# Patient Record
Sex: MALE | Race: WHITE | NOT HISPANIC OR LATINO | Employment: FULL TIME | URBAN - METROPOLITAN AREA
[De-identification: names, ages, dates, MRNs, and addresses within clinical notes are randomized per-mention and may not be internally consistent; named-entity substitution may affect disease eponyms.]

---

## 2018-03-13 ENCOUNTER — HOSPITAL ENCOUNTER (OUTPATIENT)
Dept: GENERAL RADIOLOGY | Facility: HOSPITAL | Age: 48
Discharge: HOME | End: 2018-03-13
Attending: FAMILY MEDICINE | Admitting: FAMILY MEDICINE

## 2018-03-13 ENCOUNTER — TRANSCRIBE ORDERS (OUTPATIENT)
Dept: ADMINISTRATIVE | Facility: HOSPITAL | Age: 48
End: 2018-03-13

## 2018-03-13 DIAGNOSIS — M25.552 BILATERAL HIP PAIN: Primary | ICD-10-CM

## 2018-03-13 DIAGNOSIS — M25.551 BILATERAL HIP PAIN: Primary | ICD-10-CM

## 2018-03-13 DIAGNOSIS — M25.552 BILATERAL HIP PAIN: ICD-10-CM

## 2018-03-13 DIAGNOSIS — M25.551 BILATERAL HIP PAIN: ICD-10-CM

## 2018-03-13 PROCEDURE — 73521 X-RAY EXAM HIPS BI 2 VIEWS: CPT

## 2020-04-14 ENCOUNTER — LAB REQUISITION (OUTPATIENT)
Dept: LAB | Facility: HOSPITAL | Age: 50
End: 2020-04-14

## 2020-04-14 DIAGNOSIS — Z00.00 ROUTINE GENERAL MEDICAL EXAMINATION AT A HEALTH CARE FACILITY: ICD-10-CM

## 2020-04-14 LAB — D DIMER PPP FEU-MCNC: 0.67 MCGFEU/ML (ref 0–0.56)

## 2020-04-14 PROCEDURE — 85379 FIBRIN DEGRADATION QUANT: CPT | Performed by: FAMILY MEDICINE

## 2020-04-15 ENCOUNTER — TRANSCRIBE ORDERS (OUTPATIENT)
Dept: ADMINISTRATIVE | Facility: HOSPITAL | Age: 50
End: 2020-04-15

## 2020-04-15 DIAGNOSIS — I26.99 PULMONARY EMBOLISM ON RIGHT (HCC): Primary | ICD-10-CM

## 2020-04-16 ENCOUNTER — HOSPITAL ENCOUNTER (OUTPATIENT)
Dept: CARDIOLOGY | Facility: HOSPITAL | Age: 50
Discharge: HOME | End: 2020-04-16
Admitting: FAMILY MEDICINE

## 2020-04-16 VITALS — BODY MASS INDEX: 23.91 KG/M2 | WEIGHT: 167 LBS | HEIGHT: 70 IN

## 2020-04-16 DIAGNOSIS — I26.99 PULMONARY EMBOLISM ON RIGHT (HCC): ICD-10-CM

## 2020-04-16 LAB
BH CV LOWER VASCULAR LEFT COMMON FEMORAL AUGMENT: NORMAL
BH CV LOWER VASCULAR LEFT COMMON FEMORAL COMPRESS: NORMAL
BH CV LOWER VASCULAR LEFT COMMON FEMORAL PHASIC: NORMAL
BH CV LOWER VASCULAR LEFT COMMON FEMORAL SPONT: NORMAL
BH CV LOWER VASCULAR LEFT DISTAL FEMORAL AUGMENT: NORMAL
BH CV LOWER VASCULAR LEFT DISTAL FEMORAL COMPRESS: NORMAL
BH CV LOWER VASCULAR LEFT DISTAL FEMORAL PHASIC: NORMAL
BH CV LOWER VASCULAR LEFT DISTAL FEMORAL SPONT: NORMAL
BH CV LOWER VASCULAR LEFT GASTRONEMIUS COMPRESS: NORMAL
BH CV LOWER VASCULAR LEFT GREATER SAPH AK COMPRESS: NORMAL
BH CV LOWER VASCULAR LEFT GREATER SAPH BK COMPRESS: NORMAL
BH CV LOWER VASCULAR LEFT LESSER SAPH COMPRESS: NORMAL
BH CV LOWER VASCULAR LEFT MID FEMORAL AUGMENT: NORMAL
BH CV LOWER VASCULAR LEFT MID FEMORAL COMPRESS: NORMAL
BH CV LOWER VASCULAR LEFT MID FEMORAL PHASIC: NORMAL
BH CV LOWER VASCULAR LEFT MID FEMORAL SPONT: NORMAL
BH CV LOWER VASCULAR LEFT PERONEAL AUGMENT: NORMAL
BH CV LOWER VASCULAR LEFT PERONEAL COMPRESS: NORMAL
BH CV LOWER VASCULAR LEFT POPLITEAL AUGMENT: NORMAL
BH CV LOWER VASCULAR LEFT POPLITEAL COMPRESS: NORMAL
BH CV LOWER VASCULAR LEFT POPLITEAL PHASIC: NORMAL
BH CV LOWER VASCULAR LEFT POPLITEAL SPONT: NORMAL
BH CV LOWER VASCULAR LEFT POSTERIOR TIBIAL AUGMENT: NORMAL
BH CV LOWER VASCULAR LEFT POSTERIOR TIBIAL COMPRESS: NORMAL
BH CV LOWER VASCULAR LEFT PROFUNDA FEMORAL AUGMENT: NORMAL
BH CV LOWER VASCULAR LEFT PROFUNDA FEMORAL PHASIC: NORMAL
BH CV LOWER VASCULAR LEFT PROFUNDA FEMORAL SPONT: NORMAL
BH CV LOWER VASCULAR LEFT PROXIMAL FEMORAL AUGMENT: NORMAL
BH CV LOWER VASCULAR LEFT PROXIMAL FEMORAL COMPRESS: NORMAL
BH CV LOWER VASCULAR LEFT PROXIMAL FEMORAL PHASIC: NORMAL
BH CV LOWER VASCULAR LEFT PROXIMAL FEMORAL SPONT: NORMAL
BH CV LOWER VASCULAR LEFT SAPHENOFEMORAL JUNCTION AUGMENT: NORMAL
BH CV LOWER VASCULAR LEFT SAPHENOFEMORAL JUNCTION COMPRESS: NORMAL
BH CV LOWER VASCULAR LEFT SAPHENOFEMORAL JUNCTION PHASIC: NORMAL
BH CV LOWER VASCULAR LEFT SAPHENOFEMORAL JUNCTION SPONT: NORMAL
BH CV LOWER VASCULAR RIGHT COMMON FEMORAL AUGMENT: NORMAL
BH CV LOWER VASCULAR RIGHT COMMON FEMORAL COMPRESS: NORMAL
BH CV LOWER VASCULAR RIGHT COMMON FEMORAL PHASIC: NORMAL
BH CV LOWER VASCULAR RIGHT COMMON FEMORAL SPONT: NORMAL
BH CV LOWER VASCULAR RIGHT DISTAL FEMORAL AUGMENT: NORMAL
BH CV LOWER VASCULAR RIGHT DISTAL FEMORAL COMPRESS: NORMAL
BH CV LOWER VASCULAR RIGHT DISTAL FEMORAL PHASIC: NORMAL
BH CV LOWER VASCULAR RIGHT DISTAL FEMORAL SPONT: NORMAL
BH CV LOWER VASCULAR RIGHT GASTRONEMIUS COMPRESS: NORMAL
BH CV LOWER VASCULAR RIGHT GREATER SAPH AK COMPRESS: NORMAL
BH CV LOWER VASCULAR RIGHT GREATER SAPH BK COMPRESS: NORMAL
BH CV LOWER VASCULAR RIGHT LESSER SAPH COMPRESS: NORMAL
BH CV LOWER VASCULAR RIGHT MID FEMORAL AUGMENT: NORMAL
BH CV LOWER VASCULAR RIGHT MID FEMORAL COMPRESS: NORMAL
BH CV LOWER VASCULAR RIGHT MID FEMORAL PHASIC: NORMAL
BH CV LOWER VASCULAR RIGHT MID FEMORAL SPONT: NORMAL
BH CV LOWER VASCULAR RIGHT PERONEAL AUGMENT: NORMAL
BH CV LOWER VASCULAR RIGHT PERONEAL COMPRESS: NORMAL
BH CV LOWER VASCULAR RIGHT POPLITEAL AUGMENT: NORMAL
BH CV LOWER VASCULAR RIGHT POPLITEAL COMPRESS: NORMAL
BH CV LOWER VASCULAR RIGHT POPLITEAL PHASIC: NORMAL
BH CV LOWER VASCULAR RIGHT POPLITEAL SPONT: NORMAL
BH CV LOWER VASCULAR RIGHT POSTERIOR TIBIAL AUGMENT: NORMAL
BH CV LOWER VASCULAR RIGHT POSTERIOR TIBIAL COMPRESS: NORMAL
BH CV LOWER VASCULAR RIGHT PROFUNDA FEMORAL AUGMENT: NORMAL
BH CV LOWER VASCULAR RIGHT PROFUNDA FEMORAL PHASIC: NORMAL
BH CV LOWER VASCULAR RIGHT PROFUNDA FEMORAL SPONT: NORMAL
BH CV LOWER VASCULAR RIGHT PROXIMAL FEMORAL AUGMENT: NORMAL
BH CV LOWER VASCULAR RIGHT PROXIMAL FEMORAL COMPRESS: NORMAL
BH CV LOWER VASCULAR RIGHT PROXIMAL FEMORAL PHASIC: NORMAL
BH CV LOWER VASCULAR RIGHT PROXIMAL FEMORAL SPONT: NORMAL
BH CV LOWER VASCULAR RIGHT SAPHENOFEMORAL JUNCTION AUGMENT: NORMAL
BH CV LOWER VASCULAR RIGHT SAPHENOFEMORAL JUNCTION COMPRESS: NORMAL
BH CV LOWER VASCULAR RIGHT SAPHENOFEMORAL JUNCTION PHASIC: NORMAL
BH CV LOWER VASCULAR RIGHT SAPHENOFEMORAL JUNCTION SPONT: NORMAL

## 2020-04-16 PROCEDURE — 93970 EXTREMITY STUDY: CPT | Performed by: INTERNAL MEDICINE

## 2020-04-16 PROCEDURE — 93970 EXTREMITY STUDY: CPT

## 2020-05-22 ENCOUNTER — TELEPHONE (OUTPATIENT)
Dept: ONCOLOGY | Facility: CLINIC | Age: 50
End: 2020-05-22

## 2020-05-25 NOTE — PROGRESS NOTES
DATE OF CONSULTATION: 5/26/2020    REFERRING PHYSICIAN: Андрей Kang MD    Dear Андрей Fortune MD  Thank you for asking for my medical advice on this patient. I saw him in the  [place] office on 5/26/2020    REASON FOR CONSULTATION: Right sided pulmonary embolism     HISTORY OF PRESENT ILLNESS: The patient is a very pleasant 49 y.o.  male   who was in his usual state of health until earlier on this month he presented with right lower ribs pain.  This is been going on for the last few weeks.  Get worse with deep breath.  Improves with rest.  Associated with shortness of breath.  Has been waking up at night with difficulty breathing.  Never had this problem before.  Is been having bilateral lower extremities pain.  The patient was worried about blood clot.  His primary care provider ordered a d-dimer that came back positive.  This was followed by CT PE protocol done May 5, 2020 that was suspicious for right segmental pulmonary embolism.  The patient was started on Xarelto 50 mg twice a day with improvement in his symptoms. Venous doppler right lower extremity done on April 16, 2020 was negative. I was consulted to further assist in his care.    SUBJECTIVE: When I saw the patient today he is here by himself.  He is feeling better.  He does have chronic pains however he believes his right lower ribs pain did not improve with Xarelto as well as his breathing.  Denies any bleeding.  Never been diagnosed with blood clots before.  Denies any family history of venous or arterial thrombosis.  The patient admits sedentary lifestyle.  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    Review of Systems   Constitutional: Negative for activity change, appetite change, chills, fatigue, fever and unexpected weight change.   HENT: Negative for hearing loss, mouth sores, nosebleeds, sore throat and trouble swallowing.    Eyes: Negative for visual disturbance.    Respiratory: Negative for cough, chest tightness, shortness of breath and wheezing.    Cardiovascular: Negative for chest pain, palpitations and leg swelling.   Gastrointestinal: Negative for abdominal distention, abdominal pain, blood in stool, constipation, diarrhea, nausea, rectal pain and vomiting.   Endocrine: Negative for cold intolerance and heat intolerance.   Genitourinary: Negative for difficulty urinating, dysuria, frequency and urgency.   Musculoskeletal: Negative for arthralgias, back pain, gait problem, joint swelling and myalgias.   Skin: Negative for rash.   Neurological: Negative for dizziness, tremors, syncope, weakness, light-headedness, numbness and headaches.   Hematological: Negative for adenopathy. Does not bruise/bleed easily.   Psychiatric/Behavioral: Negative for confusion, sleep disturbance and suicidal ideas. The patient is not nervous/anxious.        No past medical history on file.    Social History     Socioeconomic History   • Marital status:      Spouse name: Not on file   • Number of children: Not on file   • Years of education: Not on file   • Highest education level: Not on file       No family history on file.    No past surgical history on file.    Allergies   Allergen Reactions   • Adhesive Tape Unknown - Low Severity   • Carbamazepine Unknown - Low Severity   • Latex Unknown - Low Severity          Current Outpatient Medications:   •  celecoxib (CeleBREX) 200 MG capsule, Take 1 capsule by mouth., Disp: , Rfl:   •  cetirizine (ZyrTEC Allergy) 10 MG tablet, Take  by mouth Daily., Disp: , Rfl:   •  chlorzoxazone (PARAFON FORTE) 500 MG tablet, Take  by mouth., Disp: , Rfl:   •  gabapentin, once-daily, (Gralise) 600 MG tablet tablet, Take  by mouth., Disp: , Rfl:   •  lamoTRIgine (LaMICtal) 25 MG tablet, Take 25mg qd for a week and taper by 1 tab every 2 weeks to 50mg bid, Disp: , Rfl:   •  meloxicam (MOBIC) 7.5 MG tablet, Take  by mouth., Disp: , Rfl:   •   "mometasone-formoterol (Dulera) 100-5 MCG/ACT inhaler, Inhale 2 puffs., Disp: , Rfl:   •  pantoprazole (PROTONIX) 40 MG EC tablet, Take  by mouth Daily., Disp: , Rfl:   •  pregabalin (Lyrica) 100 MG capsule, Take 1 capsule by mouth 3 (Three) Times a Day., Disp: , Rfl:   •  sildenafil (Viagra) 100 MG tablet, Take  by mouth., Disp: , Rfl:   •  tiZANidine (ZANAFLEX) 2 MG tablet, Take  by mouth., Disp: , Rfl:   •  zolpidem (AMBIEN) 10 MG tablet, Take 10 mg by mouth., Disp: , Rfl:   •  amitriptyline (ELAVIL) 10 MG tablet, Take  by mouth Daily., Disp: , Rfl:   •  cephalexin (KEFLEX) 500 MG capsule, Take 500 mg by mouth 2 (Two) Times a Day., Disp: , Rfl:   •  diazePAM (VALIUM) 10 MG tablet, TAKE 1/2 TO 1 TABLET BY MOUTH EVERY 6 HOURS AS NEEDED FOR MUSCLE SPASMS, Disp: , Rfl:   •  enoxaparin (LOVENOX) 40 MG/0.4ML solution syringe, 1 (ONE) INJECTION DAILY 3 DAYS BEFORE SURGERY AND 7 DAYS AFTER, Disp: , Rfl:   •  gabapentin (NEURONTIN) 100 MG capsule, TAKE 3 (THREE) CAPSULE AT BEDTIME, Disp: , Rfl:   •  Naproxen Sodium 220 MG capsule, Take  by mouth., Disp: , Rfl:   •  XARELTO 15 MG tablet, TAKE 1 TABLET BY MOUTH TWICE A DAY *THEN START 20MG XARELTO, Disp: , Rfl:     PHYSICAL EXAMINATION:   /76   Pulse 87   Temp 97.3 °F (36.3 °C) (Temporal)   Resp 18   Ht 177.8 cm (70\")   Wt 84.4 kg (186 lb)   SpO2 98%   BMI 26.69 kg/m²   Pain Score    05/26/20 0945   PainSc: 0-No pain       ECOG Performance Status: 1 - Symptomatic but completely ambulatory  General Appearance:  alert, cooperative, no apparent distress and appears stated age   Neurologic/Psychiatric: A&O x 3, gait steady, appropriate affect, strength 5/5 in all muscle groups   HEENT:  Normocephalic, without obvious abnormality, mucous membranes moist   Neck: Supple, symmetrical, trachea midline, no adenopathy;  No thyromegaly, masses, or tenderness   Lungs:   Clear to auscultation bilaterally; respirations regular, even, and unlabored bilaterally   Heart:  Regular " rate and rhythm, no murmurs appreciated   Abdomen:   Soft, non-tender, non-distended and no organomegaly   Lymph nodes: No cervical, supraclavicular, inguinal or axillary adenopathy noted   Extremities: Normal, atraumatic; no clubbing, cyanosis, or edema    Skin: No rashes, ulcers, or suspicious lesions noted       No visits with results within 2 Week(s) from this visit.   Latest known visit with results is:   Hospital Outpatient Visit on 04/16/2020   Component Date Value Ref Range Status   • Right Common Femoral Spont 04/16/2020 Y   Final   • Right Common Femoral Phasic 04/16/2020 Y   Final   • Right Common Femoral Augment 04/16/2020 Y   Final   • Right Common Femoral Compress 04/16/2020 C   Final   • Right Saphenofemoral Junction Spont 04/16/2020 Y   Final   • Right Saphenofemoral Junction Phas* 04/16/2020 Y   Final   • Right Saphenofemoral Junction Augm* 04/16/2020 Y   Final   • Right Saphenofemoral Junction Comp* 04/16/2020 C   Final   • Right Profunda Femoral Spont 04/16/2020 Y   Final   • Right Profunda Femoral Phasic 04/16/2020 Y   Final   • Right Profunda Femoral Augment 04/16/2020 Y   Final   • Right Proximal Femoral Spont 04/16/2020 Y   Final   • Right Proximal Femoral Phasic 04/16/2020 Y   Final   • Right Proximal Femoral Augment 04/16/2020 Y   Final   • Right Proximal Femoral Compress 04/16/2020 C   Final   • Right Mid Femoral Spont 04/16/2020 Y   Final   • Right Mid Femoral Phasic 04/16/2020 Y   Final   • Right Mid Femoral Augment 04/16/2020 Y   Final   • Right Mid Femoral Compress 04/16/2020 C   Final   • Right Distal Femoral Spont 04/16/2020 Y   Final   • Right Distal Femoral Phasic 04/16/2020 Y   Final   • Right Distal Femoral Augment 04/16/2020 Y   Final   • Right Distal Femoral Compress 04/16/2020 C   Final   • Right Popliteal Spont 04/16/2020 Y   Final   • Right Popliteal Phasic 04/16/2020 Y   Final   • Right Popliteal Augment 04/16/2020 Y   Final   • Right Popliteal Compress 04/16/2020 C   Final    • Right Posterior Tibial Augment 04/16/2020 Y   Final   • Right Posterior Tibial Compress 04/16/2020 C   Final   • Right Peroneal Augment 04/16/2020 Y   Final   • Right Peroneal Compress 04/16/2020 C   Final   • Right GastronemiusSoleal Compress 04/16/2020 C   Final   • Right Greater Saph AK Compress 04/16/2020 C   Final   • Right Greater Saph BK Compress 04/16/2020 C   Final   • Right Lesser Saph Compress 04/16/2020 C   Final   • Left Common Femoral Spont 04/16/2020 Y   Final   • Left Common Femoral Phasic 04/16/2020 Y   Final   • Left Common Femoral Augment 04/16/2020 Y   Final   • Left Common Femoral Compress 04/16/2020 C   Final   • Left Saphenofemoral Junction Spont 04/16/2020 Y   Final   • Left Saphenofemoral Junction Phasic 04/16/2020 Y   Final   • Left Saphenofemoral Junction Augme* 04/16/2020 Y   Final   • Left Saphenofemoral Junction Compr* 04/16/2020 C   Final   • Left Profunda Femoral Spont 04/16/2020 Y   Final   • Left Profunda Femoral Phasic 04/16/2020 Y   Final   • Left Profunda Femoral Augment 04/16/2020 Y   Final   • Left Proximal Femoral Spont 04/16/2020 Y   Final   • Left Proximal Femoral Phasic 04/16/2020 Y   Final   • Left Proximal Femoral Augment 04/16/2020 Y   Final   • Left Proximal Femoral Compress 04/16/2020 C   Final   • Left Mid Femoral Spont 04/16/2020 Y   Final   • Left Mid Femoral Phasic 04/16/2020 Y   Final   • Left Mid Femoral Augment 04/16/2020 Y   Final   • Left Mid Femoral Compress 04/16/2020 C   Final   • Left Distal Femoral Spont 04/16/2020 Y   Final   • Left Distal Femoral Phasic 04/16/2020 Y   Final   • Left Distal Femoral Augment 04/16/2020 Y   Final   • Left Distal Femoral Compress 04/16/2020 C   Final   • Left Popliteal Spont 04/16/2020 Y   Final   • Left Popliteal Phasic 04/16/2020 Y   Final   • Left Popliteal Augment 04/16/2020 Y   Final   • Left Popliteal Compress 04/16/2020 C   Final   • Left Posterior Tibial Augment 04/16/2020 Y   Final   • Left Posterior Tibial  Compress 04/16/2020 C   Final   • Left Peroneal Augment 04/16/2020 Y   Final   • Left Peroneal Compress 04/16/2020 C   Final   • Left GastronemiusSoleal Compress 04/16/2020 C   Final   • Left Greater Saph AK Compress 04/16/2020 C   Final   • Left Greater Saph BK Compress 04/16/2020 C   Final   • Left Lesser Saph Compress 04/16/2020 C   Final        No results found.      DIAGNOSTIC DATA:   1. Radiology:   (ICD-10-CM)]   Interpretation Summary     · No evidence of deep or superficial venous thrombosis in the right or left lower extremities.       2. Dr. Kang's note reviewed by me and documented in the  chart.   3. Pathology report: none available  4. Laboratory data: reviewed labs from April 15, 2020 and documented in the patient's chart.     ASSESSMENT: The patient is a very pleasant 49 y.o.  male  with right segmental pulmonary embolism    PROBLEM LIST:   1.  Right lower lobe pulmonary embolism:  A.  Presented with shortness of breath and chest pain  B.  CT PE protocol done on May 5, 2020 was questionable for right segmental pulmonary embolism  C.  Currently on Xarelto once a day  2.  Immobility  3.  Neuropathy    PLAN:   1. I had a long discussion today with the patient about his  new diagnosis of PE. I reviewed the patient's documents including refereing provider's notes, lab results, imaging, and path report.   2.  Regarding etiology patient has multiple acquired risk factor including immobility as well as frequent travel.  He did have recent procedure done as well with a spinal stimulator trial.  Given his young age as well as the lack of other risk factors I think inherited thrombophilia is a concern as well.  3.  Regarding treatment Xarelto is an FDA approved treatment.  Patient is unable to afford high copayment.  We will switch to Eliquis 5 mg twice daily.  4.  Regarding duration of treatment this is been more difficult question.  The patient will be treated with 5 mg twice a day at least for 6 months.   At that point I will stop his anticoagulation and do thrombophilia work-up.  The patient might benefit from lifelong anticoagulation if he does not change his lifestyle or if he has evidence of inherited thrombophilia.  5.  The patient will follow-up with me in 6 months per  7.  Patient was advised to exercise.  8. He will continue Lyrica and gabapentin for neuropathy.  Marco A Suggs MD  5/26/2020

## 2020-05-26 ENCOUNTER — CONSULT (OUTPATIENT)
Dept: ONCOLOGY | Facility: CLINIC | Age: 50
End: 2020-05-26

## 2020-05-26 VITALS
DIASTOLIC BLOOD PRESSURE: 76 MMHG | SYSTOLIC BLOOD PRESSURE: 110 MMHG | WEIGHT: 186 LBS | HEART RATE: 87 BPM | BODY MASS INDEX: 26.63 KG/M2 | TEMPERATURE: 97.3 F | HEIGHT: 70 IN | OXYGEN SATURATION: 98 % | RESPIRATION RATE: 18 BRPM

## 2020-05-26 DIAGNOSIS — I26.99 PULMONARY EMBOLISM ON RIGHT (HCC): Primary | ICD-10-CM

## 2020-05-26 PROCEDURE — 99204 OFFICE O/P NEW MOD 45 MIN: CPT | Performed by: INTERNAL MEDICINE

## 2020-05-26 RX ORDER — DIAZEPAM 10 MG/1
TABLET ORAL
COMMUNITY
Start: 2020-05-16 | End: 2020-11-20

## 2020-05-26 RX ORDER — CELECOXIB 200 MG/1
1 CAPSULE ORAL
COMMUNITY
Start: 2020-04-14 | End: 2020-11-20

## 2020-05-26 RX ORDER — AMITRIPTYLINE HYDROCHLORIDE 10 MG/1
TABLET, FILM COATED ORAL DAILY
COMMUNITY
End: 2020-11-20

## 2020-05-26 RX ORDER — PANTOPRAZOLE SODIUM 40 MG/1
TABLET, DELAYED RELEASE ORAL DAILY
COMMUNITY
Start: 2019-12-05 | End: 2020-11-20

## 2020-05-26 RX ORDER — RIVAROXABAN 15 MG/1
TABLET, FILM COATED ORAL
COMMUNITY
Start: 2020-04-15 | End: 2020-06-08 | Stop reason: ALTCHOICE

## 2020-05-26 RX ORDER — SILDENAFIL 100 MG/1
TABLET, FILM COATED ORAL
COMMUNITY
Start: 2015-05-05 | End: 2020-11-20

## 2020-05-26 RX ORDER — PREGABALIN 100 MG/1
100 CAPSULE ORAL NIGHTLY
COMMUNITY
Start: 2015-05-05

## 2020-05-26 RX ORDER — ZOLPIDEM TARTRATE 10 MG/1
10 TABLET ORAL NIGHTLY PRN
COMMUNITY
Start: 2017-04-27

## 2020-05-26 RX ORDER — LAMOTRIGINE 25 MG/1
TABLET ORAL
COMMUNITY
Start: 2017-11-07 | End: 2020-11-20

## 2020-05-26 RX ORDER — CHLORZOXAZONE 500 MG/1
TABLET ORAL
COMMUNITY
Start: 2020-04-14 | End: 2020-11-20

## 2020-05-26 RX ORDER — GABAPENTIN 100 MG/1
100 CAPSULE ORAL DAILY
COMMUNITY
Start: 2020-04-22 | End: 2021-09-17

## 2020-05-26 RX ORDER — CEPHALEXIN 500 MG/1
500 CAPSULE ORAL 2 TIMES DAILY
COMMUNITY
Start: 2020-05-16 | End: 2020-11-20

## 2020-05-26 RX ORDER — COVID-19 ANTIGEN TEST
KIT MISCELLANEOUS
COMMUNITY
End: 2020-11-20

## 2020-05-26 RX ORDER — MELOXICAM 7.5 MG/1
TABLET ORAL
COMMUNITY
Start: 2015-05-05 | End: 2020-11-20

## 2020-05-26 RX ORDER — CETIRIZINE HYDROCHLORIDE 10 MG/1
TABLET ORAL DAILY
COMMUNITY
Start: 2008-02-22

## 2020-05-26 RX ORDER — TIZANIDINE 2 MG/1
TABLET ORAL
COMMUNITY
Start: 2019-12-05 | End: 2020-11-20

## 2020-06-08 ENCOUNTER — TELEPHONE (OUTPATIENT)
Dept: ONCOLOGY | Facility: CLINIC | Age: 50
End: 2020-06-08

## 2020-06-08 NOTE — TELEPHONE ENCOUNTER
Patient called wanting his Eliquis sent in to [facility] on [address]. He only has one left for today.

## 2020-06-11 ENCOUNTER — TELEPHONE (OUTPATIENT)
Dept: ONCOLOGY | Facility: CLINIC | Age: 50
End: 2020-06-11

## 2020-06-11 NOTE — TELEPHONE ENCOUNTER
Called [facility] and received fax. Discussed with dr patrick, and patient advised I will fax this back tomorrow after receiving signature for clearance. To return call if any further questions or concerns

## 2020-06-11 NOTE — TELEPHONE ENCOUNTER
PT called to get the form faxed back to his surgeon clearing him for spinal surgery. He needs ir within the next couple of days. Please call pt back with an update @[phone].

## 2020-06-12 NOTE — TELEPHONE ENCOUNTER
PT CALLED BACK. HE WANTS TO MAKE SURE DR PARRISH AGREES AND HAS FAXED BACK THE FORM TO HIS SURGERON.    BERTA   - [phone]

## 2020-06-15 NOTE — TELEPHONE ENCOUNTER
Can you fax surgery clearance to [facility]  Fax:[phone]      Family  did not send it over to them as they should have.

## 2020-08-03 ENCOUNTER — TELEPHONE (OUTPATIENT)
Dept: ONCOLOGY | Facility: CLINIC | Age: 50
End: 2020-08-03

## 2020-08-03 NOTE — TELEPHONE ENCOUNTER
Patient has dry eye syndrome and being on eliquis twice a day is making his eyes and mouth even drier. Patient has having trouble reading his laptop and dry mouth is giving him dental pain. Patient has stopped taking the eliquis for one day and it got a little better and then started taking it the next day and the dryness in eyes and mouth came back     Patient was taking xarelto once daily and had no effect on his eyes or mouth. Patient wants to see if he can go back on xarelto      [facility] [phone]    Patient phone # [phone]

## 2020-08-03 NOTE — TELEPHONE ENCOUNTER
Discussed with dr patrick, and patient advised that we will send in xarelto 20mg QD. He states that he had back surgery, so his deductible has been met and he should have no issues with co-pay, but will call back if that is not the case

## 2020-09-11 ENCOUNTER — HOSPITAL ENCOUNTER (OUTPATIENT)
Dept: GENERAL RADIOLOGY | Facility: HOSPITAL | Age: 50
Discharge: HOME | End: 2020-09-11
Admitting: ANESTHESIOLOGY

## 2020-09-11 ENCOUNTER — TRANSCRIBE ORDERS (OUTPATIENT)
Dept: ADMINISTRATIVE | Facility: HOSPITAL | Age: 50
End: 2020-09-11

## 2020-09-11 DIAGNOSIS — M47.812 CERVICAL SPONDYLOSIS WITHOUT MYELOPATHY: Primary | ICD-10-CM

## 2020-09-11 DIAGNOSIS — M47.812 CERVICAL SPONDYLOSIS WITHOUT MYELOPATHY: ICD-10-CM

## 2020-09-11 PROCEDURE — 72040 X-RAY EXAM NECK SPINE 2-3 VW: CPT

## 2020-11-20 ENCOUNTER — OFFICE VISIT (OUTPATIENT)
Dept: ONCOLOGY | Facility: CLINIC | Age: 50
End: 2020-11-20

## 2020-11-20 VITALS
OXYGEN SATURATION: 98 % | WEIGHT: 169 LBS | RESPIRATION RATE: 16 BRPM | SYSTOLIC BLOOD PRESSURE: 119 MMHG | BODY MASS INDEX: 24.2 KG/M2 | HEART RATE: 72 BPM | HEIGHT: 70 IN | DIASTOLIC BLOOD PRESSURE: 75 MMHG | TEMPERATURE: 96.6 F

## 2020-11-20 DIAGNOSIS — I26.99 PULMONARY EMBOLISM ON RIGHT (HCC): Primary | ICD-10-CM

## 2020-11-20 PROCEDURE — 99214 OFFICE O/P EST MOD 30 MIN: CPT | Performed by: INTERNAL MEDICINE

## 2020-11-20 RX ORDER — TRAMADOL HYDROCHLORIDE 50 MG/1
50 TABLET ORAL NIGHTLY
COMMUNITY
End: 2021-09-17

## 2020-11-20 NOTE — PROGRESS NOTES
DATE OF VISIT: 11/20/2020    REASON FOR VISIT: Followup for right-sided pulmonary embolism     HISTORY OF PRESENT ILLNESS: The patient is a very pleasant 50 y.o. male  with past medical history significant for PE diagnosed May 2020. The  patient is here today for scheduled follow-up visit    SUBJECTIVE: The patient has been doing fairly well. he was able to tolerate  his treatment without any serious side effects. he denied any fever or  chills, no night sweats, denied any headaches    PAST MEDICAL HISTORY/SOCIAL HISTORY/FAMILY HISTORY: Reviewed by me and unchanged from my documentation done on 11/20/20.    Review of Systems   Constitutional: Negative for activity change, appetite change, chills, fatigue, fever and unexpected weight change.   HENT: Negative for hearing loss, mouth sores, nosebleeds, sore throat and trouble swallowing.    Eyes: Negative for visual disturbance.   Respiratory: Negative for cough, chest tightness, shortness of breath and wheezing.    Cardiovascular: Negative for chest pain, palpitations and leg swelling.   Gastrointestinal: Negative for abdominal distention, abdominal pain, blood in stool, constipation, diarrhea, nausea, rectal pain and vomiting.   Endocrine: Negative for cold intolerance and heat intolerance.   Genitourinary: Negative for difficulty urinating, dysuria, frequency and urgency.   Musculoskeletal: Negative for arthralgias, back pain, gait problem, joint swelling and myalgias.   Skin: Negative for rash.   Neurological: Negative for dizziness, tremors, syncope, weakness, light-headedness, numbness and headaches.   Hematological: Negative for adenopathy. Does not bruise/bleed easily.   Psychiatric/Behavioral: Negative for confusion, sleep disturbance and suicidal ideas. The patient is not nervous/anxious.          Current Outpatient Medications:   •  cetirizine (ZyrTEC Allergy) 10 MG tablet, Take  by mouth Daily., Disp: , Rfl:   •  gabapentin (NEURONTIN) 100 MG capsule, Take  "100 mg by mouth Daily., Disp: , Rfl:   •  pregabalin (Lyrica) 100 MG capsule, Take 150 mg by mouth 2 (two) times a day., Disp: , Rfl:   •  rivaroxaban (XARELTO) 20 MG tablet, Take 1 tablet by mouth Daily., Disp: 30 tablet, Rfl: 5  •  traMADol (ULTRAM) 50 MG tablet, Take 50 mg by mouth Every Night., Disp: , Rfl:   •  zolpidem (AMBIEN) 10 MG tablet, Take 10 mg by mouth., Disp: , Rfl:     PHYSICAL EXAMINATION:   /75   Pulse 72   Temp 96.6 °F (35.9 °C) (Temporal)   Resp 16   Ht 177.8 cm (70\")   Wt 76.7 kg (169 lb)   SpO2 98%   BMI 24.25 kg/m²    Pain Score    11/20/20 1504   PainSc:   6   PainLoc: Leg  Comment: Left leg pain       ECOG Performance Status: 1 - Symptomatic but completely ambulatory  General Appearance:  alert, cooperative, no apparent distress and appears stated age   Neurologic/Psychiatric: A&O x 3, gait steady, appropriate affect, strength 5/5 in all muscle groups   HEENT:  Normocephalic, without obvious abnormality, mucous membranes moist   Neck: Supple, symmetrical, trachea midline, no adenopathy;  No thyromegaly, masses, or tenderness   Lungs:   Clear to auscultation bilaterally; respirations regular, even, and unlabored bilaterally   Heart:  Regular rate and rhythm, no murmurs appreciated   Abdomen:   Soft, non-tender, non-distended and no organomegaly   Lymph nodes: No cervical, supraclavicular, inguinal or axillary adenopathy noted   Extremities: Normal, atraumatic; no clubbing, cyanosis, or edema    Skin: No rashes, ulcers, or suspicious lesions noted     No visits with results within 2 Week(s) from this visit.   Latest known visit with results is:   Hospital Outpatient Visit on 04/16/2020   Component Date Value Ref Range Status   • Right Common Femoral Spont 04/16/2020 Y   Final   • Right Common Femoral Phasic 04/16/2020 Y   Final   • Right Common Femoral Augment 04/16/2020 Y   Final   • Right Common Femoral Compress 04/16/2020 C   Final   • Right Saphenofemoral Junction Spont " 04/16/2020 Y   Final   • Right Saphenofemoral Junction Phas* 04/16/2020 Y   Final   • Right Saphenofemoral Junction Augm* 04/16/2020 Y   Final   • Right Saphenofemoral Junction Comp* 04/16/2020 C   Final   • Right Profunda Femoral Spont 04/16/2020 Y   Final   • Right Profunda Femoral Phasic 04/16/2020 Y   Final   • Right Profunda Femoral Augment 04/16/2020 Y   Final   • Right Proximal Femoral Spont 04/16/2020 Y   Final   • Right Proximal Femoral Phasic 04/16/2020 Y   Final   • Right Proximal Femoral Augment 04/16/2020 Y   Final   • Right Proximal Femoral Compress 04/16/2020 C   Final   • Right Mid Femoral Spont 04/16/2020 Y   Final   • Right Mid Femoral Phasic 04/16/2020 Y   Final   • Right Mid Femoral Augment 04/16/2020 Y   Final   • Right Mid Femoral Compress 04/16/2020 C   Final   • Right Distal Femoral Spont 04/16/2020 Y   Final   • Right Distal Femoral Phasic 04/16/2020 Y   Final   • Right Distal Femoral Augment 04/16/2020 Y   Final   • Right Distal Femoral Compress 04/16/2020 C   Final   • Right Popliteal Spont 04/16/2020 Y   Final   • Right Popliteal Phasic 04/16/2020 Y   Final   • Right Popliteal Augment 04/16/2020 Y   Final   • Right Popliteal Compress 04/16/2020 C   Final   • Right Posterior Tibial Augment 04/16/2020 Y   Final   • Right Posterior Tibial Compress 04/16/2020 C   Final   • Right Peroneal Augment 04/16/2020 Y   Final   • Right Peroneal Compress 04/16/2020 C   Final   • Right GastronemiusSoleal Compress 04/16/2020 C   Final   • Right Greater Saph AK Compress 04/16/2020 C   Final   • Right Greater Saph BK Compress 04/16/2020 C   Final   • Right Lesser Saph Compress 04/16/2020 C   Final   • Left Common Femoral Spont 04/16/2020 Y   Final   • Left Common Femoral Phasic 04/16/2020 Y   Final   • Left Common Femoral Augment 04/16/2020 Y   Final   • Left Common Femoral Compress 04/16/2020 C   Final   • Left Saphenofemoral Junction Spont 04/16/2020 Y   Final   • Left Saphenofemoral Junction Phasic  04/16/2020 Y   Final   • Left Saphenofemoral Junction Augme* 04/16/2020 Y   Final   • Left Saphenofemoral Junction Compr* 04/16/2020 C   Final   • Left Profunda Femoral Spont 04/16/2020 Y   Final   • Left Profunda Femoral Phasic 04/16/2020 Y   Final   • Left Profunda Femoral Augment 04/16/2020 Y   Final   • Left Proximal Femoral Spont 04/16/2020 Y   Final   • Left Proximal Femoral Phasic 04/16/2020 Y   Final   • Left Proximal Femoral Augment 04/16/2020 Y   Final   • Left Proximal Femoral Compress 04/16/2020 C   Final   • Left Mid Femoral Spont 04/16/2020 Y   Final   • Left Mid Femoral Phasic 04/16/2020 Y   Final   • Left Mid Femoral Augment 04/16/2020 Y   Final   • Left Mid Femoral Compress 04/16/2020 C   Final   • Left Distal Femoral Spont 04/16/2020 Y   Final   • Left Distal Femoral Phasic 04/16/2020 Y   Final   • Left Distal Femoral Augment 04/16/2020 Y   Final   • Left Distal Femoral Compress 04/16/2020 C   Final   • Left Popliteal Spont 04/16/2020 Y   Final   • Left Popliteal Phasic 04/16/2020 Y   Final   • Left Popliteal Augment 04/16/2020 Y   Final   • Left Popliteal Compress 04/16/2020 C   Final   • Left Posterior Tibial Augment 04/16/2020 Y   Final   • Left Posterior Tibial Compress 04/16/2020 C   Final   • Left Peroneal Augment 04/16/2020 Y   Final   • Left Peroneal Compress 04/16/2020 C   Final   • Left GastronemiusSoleal Compress 04/16/2020 C   Final   • Left Greater Saph AK Compress 04/16/2020 C   Final   • Left Greater Saph BK Compress 04/16/2020 C   Final   • Left Lesser Saph Compress 04/16/2020 C   Final        No results found.    ASSESSMENT: The patient is a very pleasant 50 y.o. male  with right segmental pulmonary embolism    PROBLEM LIST:   1.  Right lower lobe pulmonary embolism:  A.  Presented with shortness of breath and chest pain  B.  CT PE protocol done on May 5, 2020 was questionable for right segmental pulmonary embolism  C.  Currently on Xarelto once a day  2.  Immobility  3.   Neuropathy    PLAN:  1.  I will hold Xarelto 20 mg daily.  2.  We will check patient's blood work in 2 weeks we will do thrombophilia work-up including CBC, D-dimer, prothrombin gene mutation, factor V Leiden, lupus anticoagulant, anticardiolipin antibodies, beta-2 glycoprotein 1 antibody, protein C protein S level and function, Antithrombin III.  3.  Patient will resume Xarelto 20 mg daily after doing the blood work for  4.  He will follow-up with me in 3 weeks to go over the results.  With everything is negative I will stop anticoagulation on the other hand if has evidence of thrombophilia I will keep you on maintenance dose Xarelto 10 mg daily.      Marco A Suggs MD  11/20/2020

## 2020-11-30 ENCOUNTER — LAB (OUTPATIENT)
Dept: LAB | Facility: HOSPITAL | Age: 50
End: 2020-11-30

## 2020-11-30 DIAGNOSIS — I26.99 PULMONARY EMBOLISM ON RIGHT (HCC): ICD-10-CM

## 2020-11-30 LAB
BASOPHILS # BLD AUTO: 0.05 10*3/MM3 (ref 0–0.2)
BASOPHILS NFR BLD AUTO: 1.1 % (ref 0–1.5)
D DIMER PPP FEU-MCNC: 0.79 MCGFEU/ML (ref 0–0.56)
DEPRECATED RDW RBC AUTO: 39.6 FL (ref 37–54)
EOSINOPHIL # BLD AUTO: 0.13 10*3/MM3 (ref 0–0.4)
EOSINOPHIL NFR BLD AUTO: 2.7 % (ref 0.3–6.2)
ERYTHROCYTE [DISTWIDTH] IN BLOOD BY AUTOMATED COUNT: 12.3 % (ref 12.3–15.4)
HCT VFR BLD AUTO: 49.7 % (ref 37.5–51)
HGB BLD-MCNC: 16.1 G/DL (ref 13–17.7)
IMM GRANULOCYTES # BLD AUTO: 0.02 10*3/MM3 (ref 0–0.05)
IMM GRANULOCYTES NFR BLD AUTO: 0.4 % (ref 0–0.5)
LYMPHOCYTES # BLD AUTO: 1.91 10*3/MM3 (ref 0.7–3.1)
LYMPHOCYTES NFR BLD AUTO: 40.3 % (ref 19.6–45.3)
MCH RBC QN AUTO: 28.5 PG (ref 26.6–33)
MCHC RBC AUTO-ENTMCNC: 32.4 G/DL (ref 31.5–35.7)
MCV RBC AUTO: 88.1 FL (ref 79–97)
MONOCYTES # BLD AUTO: 0.5 10*3/MM3 (ref 0.1–0.9)
MONOCYTES NFR BLD AUTO: 10.5 % (ref 5–12)
NEUTROPHILS NFR BLD AUTO: 2.13 10*3/MM3 (ref 1.7–7)
NEUTROPHILS NFR BLD AUTO: 45 % (ref 42.7–76)
NRBC BLD AUTO-RTO: 0 /100 WBC (ref 0–0.2)
PLATELET # BLD AUTO: 193 10*3/MM3 (ref 140–450)
PMV BLD AUTO: 11.9 FL (ref 6–12)
RBC # BLD AUTO: 5.64 10*6/MM3 (ref 4.14–5.8)
WBC # BLD AUTO: 4.74 10*3/MM3 (ref 3.4–10.8)

## 2020-11-30 PROCEDURE — 36415 COLL VENOUS BLD VENIPUNCTURE: CPT

## 2020-11-30 PROCEDURE — 85379 FIBRIN DEGRADATION QUANT: CPT

## 2020-11-30 PROCEDURE — 81240 F2 GENE: CPT

## 2020-11-30 PROCEDURE — 85732 THROMBOPLASTIN TIME PARTIAL: CPT

## 2020-11-30 PROCEDURE — 85300 ANTITHROMBIN III ACTIVITY: CPT

## 2020-11-30 PROCEDURE — 85303 CLOT INHIBIT PROT C ACTIVITY: CPT

## 2020-11-30 PROCEDURE — 85025 COMPLETE CBC W/AUTO DIFF WBC: CPT

## 2020-11-30 PROCEDURE — 85613 RUSSELL VIPER VENOM DILUTED: CPT

## 2020-11-30 PROCEDURE — 86146 BETA-2 GLYCOPROTEIN ANTIBODY: CPT

## 2020-11-30 PROCEDURE — 85302 CLOT INHIBIT PROT C ANTIGEN: CPT

## 2020-11-30 PROCEDURE — 85670 THROMBIN TIME PLASMA: CPT

## 2020-11-30 PROCEDURE — 85306 CLOT INHIBIT PROT S FREE: CPT

## 2020-11-30 PROCEDURE — 81241 F5 GENE: CPT

## 2020-11-30 PROCEDURE — 85305 CLOT INHIBIT PROT S TOTAL: CPT

## 2020-11-30 PROCEDURE — 86147 CARDIOLIPIN ANTIBODY EA IG: CPT

## 2020-11-30 PROCEDURE — 85705 THROMBOPLASTIN INHIBITION: CPT

## 2020-12-01 LAB
CARDIOLIPIN IGG SER IA-ACNC: <9 GPL U/ML (ref 0–14)
CARDIOLIPIN IGM SER IA-ACNC: <9 MPL U/ML (ref 0–12)

## 2020-12-02 LAB
APTT SCREEN TO CONFIRM RATIO: 1.05 RATIO (ref 0–1.4)
AT III ACT/NOR PPP CHRO: 108 % (ref 75–135)
B2 GLYCOPROT1 IGA SER-ACNC: <9 GPI IGA UNITS (ref 0–25)
B2 GLYCOPROT1 IGG SER-ACNC: 43 GPI IGG UNITS (ref 0–20)
B2 GLYCOPROT1 IGM SER-ACNC: <9 GPI IGM UNITS (ref 0–32)
CONFIRM APTT/NORMAL: 31.1 SEC (ref 0–55)
F5 GENE MUT ANL BLD/T: NORMAL
FACTOR II, DNA ANALYSIS: NORMAL
LA 2 SCREEN W REFLEX-IMP: NORMAL
PROT C ACT/NOR PPP CHRO: 102 %
PROT S ACT/NOR PPP: 110 % (ref 63–140)
SCREEN APTT: 33.2 SEC (ref 0–51.9)
SCREEN DRVVT: 31.2 SEC (ref 0–47)
THROMBIN TIME: 19.3 SEC (ref 0–23)

## 2020-12-03 ENCOUNTER — TELEPHONE (OUTPATIENT)
Dept: ONCOLOGY | Facility: CLINIC | Age: 50
End: 2020-12-03

## 2020-12-03 LAB
PROT C AG ACT/NOR PPP IA: 79 % (ref 60–150)
PROT S AG ACT/NOR PPP IA: 90 % (ref 60–150)
PROT S FREE AG ACT/NOR PPP IA: 108 % (ref 57–157)

## 2020-12-03 NOTE — TELEPHONE ENCOUNTER
Called and asked patient what labs he specifically had questions about.  He advised that he could just see the results in the portal and wanted to know if we knew the plan yet.  Advised that Dr. Suggs would go over his results at his f/u on 12/11.  Patient verbalized understanding and was ok to discuss at appt.

## 2020-12-03 NOTE — TELEPHONE ENCOUNTER
Patient called ask for a call back regarding his Lab results    Best call back number [phone]

## 2020-12-07 RX ORDER — RIVAROXABAN 20 MG/1
TABLET, FILM COATED ORAL
Qty: 90 TABLET | Refills: 1 | OUTPATIENT
Start: 2020-12-07

## 2020-12-11 ENCOUNTER — OFFICE VISIT (OUTPATIENT)
Dept: ONCOLOGY | Facility: CLINIC | Age: 50
End: 2020-12-11

## 2020-12-11 VITALS
RESPIRATION RATE: 18 BRPM | TEMPERATURE: 96.2 F | HEIGHT: 70 IN | SYSTOLIC BLOOD PRESSURE: 127 MMHG | WEIGHT: 184 LBS | OXYGEN SATURATION: 99 % | BODY MASS INDEX: 26.34 KG/M2 | HEART RATE: 99 BPM | DIASTOLIC BLOOD PRESSURE: 77 MMHG

## 2020-12-11 DIAGNOSIS — I26.99 PULMONARY EMBOLISM ON RIGHT (HCC): Primary | ICD-10-CM

## 2020-12-11 PROCEDURE — 99214 OFFICE O/P EST MOD 30 MIN: CPT | Performed by: INTERNAL MEDICINE

## 2020-12-11 NOTE — PROGRESS NOTES
DATE OF VISIT: 12/11/2020    REASON FOR VISIT: Followup for right-sided pulmonary embolism     HISTORY OF PRESENT ILLNESS: The patient is a very pleasant 50 y.o. male  with past medical history significant for PE diagnosed May 2020. The  patient is here today for scheduled follow-up visit    SUBJECTIVE: The patient is here today by himself. He has been compliant with his treatment.  Denies any bleeding.    PAST MEDICAL HISTORY/SOCIAL HISTORY/FAMILY HISTORY: Reviewed by me and unchanged from my documentation done on 12/11/20.    Review of Systems   Constitutional: Negative for activity change, appetite change, chills, fatigue, fever and unexpected weight change.   HENT: Negative for hearing loss, mouth sores, nosebleeds, sore throat and trouble swallowing.    Eyes: Negative for visual disturbance.   Respiratory: Negative for cough, chest tightness, shortness of breath and wheezing.    Cardiovascular: Negative for chest pain, palpitations and leg swelling.   Gastrointestinal: Negative for abdominal distention, abdominal pain, blood in stool, constipation, diarrhea, nausea, rectal pain and vomiting.   Endocrine: Negative for cold intolerance and heat intolerance.   Genitourinary: Negative for difficulty urinating, dysuria, frequency and urgency.   Musculoskeletal: Negative for arthralgias, back pain, gait problem, joint swelling and myalgias.   Skin: Negative for rash.   Neurological: Negative for dizziness, tremors, syncope, weakness, light-headedness, numbness and headaches.   Hematological: Negative for adenopathy. Does not bruise/bleed easily.   Psychiatric/Behavioral: Negative for confusion, sleep disturbance and suicidal ideas. The patient is not nervous/anxious.          Current Outpatient Medications:   •  cetirizine (ZyrTEC Allergy) 10 MG tablet, Take  by mouth Daily., Disp: , Rfl:   •  gabapentin (NEURONTIN) 100 MG capsule, Take 100 mg by mouth Daily., Disp: , Rfl:   •  pregabalin (Lyrica) 100 MG capsule,  "Take 150 mg by mouth 2 (two) times a day., Disp: , Rfl:   •  rivaroxaban (XARELTO) 20 MG tablet, Take 1 tablet by mouth Daily., Disp: 30 tablet, Rfl: 5  •  traMADol (ULTRAM) 50 MG tablet, Take 50 mg by mouth Every Night., Disp: , Rfl:   •  zolpidem (AMBIEN) 10 MG tablet, Take 10 mg by mouth., Disp: , Rfl:     PHYSICAL EXAMINATION:   /77   Pulse 99   Temp 96.2 °F (35.7 °C) (Temporal)   Resp 18   Ht 177.8 cm (70\")   Wt 83.5 kg (184 lb)   SpO2 99%   BMI 26.40 kg/m²    Pain Score    12/11/20 1319   PainSc: 0-No pain       ECOG Performance Status: 1 - Symptomatic but completely ambulatory  General Appearance:  alert, cooperative, no apparent distress and appears stated age   Neurologic/Psychiatric: A&O x 3, gait steady, appropriate affect, strength 5/5 in all muscle groups   HEENT:  Normocephalic, without obvious abnormality, mucous membranes moist   Neck: Supple, symmetrical, trachea midline, no adenopathy;  No thyromegaly, masses, or tenderness   Lungs:   Clear to auscultation bilaterally; respirations regular, even, and unlabored bilaterally   Heart:  Regular rate and rhythm, no murmurs appreciated   Abdomen:   Soft, non-tender, non-distended and no organomegaly   Lymph nodes: No cervical, supraclavicular, inguinal or axillary adenopathy noted   Extremities: Normal, atraumatic; no clubbing, cyanosis, or edema    Skin: No rashes, ulcers, or suspicious lesions noted     Lab on 11/30/2020   Component Date Value Ref Range Status   • Anticardiolipin IgG 11/30/2020 <9  0 - 14 GPL U/mL Final                              Negative:              <15                            Indeterminate:     15 - 20                            Low-Med Positive: >20 - 80                            High Positive:         >80   • Anticardiolipin IgM 11/30/2020 <9  0 - 12 MPL U/mL Final                              Negative:              <13                            Indeterminate:     13 - 20                            Low-Med " Positive: >20 - 80                            High Positive:         >80   • AntiThromb III Func 11/30/2020 108  75 - 135 % Final    Direct Xa inhibitor anticoagulants such as rivaroxaban, apixaban and  edoxaban will lead to spuriously elevated antithrombin activity  levels possibly masking a deficiency.   • Factor V Leiden 11/30/2020 Normal  Normal Final   • Protein C Antigen 11/30/2020 79  60 - 150 % Final   • Protein S Ag, Total 11/30/2020 90  60 - 150 % Final    This test was developed and its performance characteristics  determined by [facility]. It has not been cleared or approved  by the Food and Drug Administration.   • Protein S Ag, Free 11/30/2020 108  57 - 157 % Final    This test was developed and its performance characteristics  determined by [facility]. It has not been cleared or approved  by the Food and Drug Administration.   • Protein S-Functional 11/30/2020 110  63 - 140 % Final    Protein S activity may be falsely increased (masking an abnormal, low  result) in patients receiving direct Xa inhibitor (e.g., rivaroxaban,  apixaban, edoxaban) or a direct thrombin inhibitor (e.g., dabigatran)  anticoagulant treatment due to assay interference by these drugs.   • Prt C Activity (Chromogenic) 11/30/2020 102  % Final    Reference Range:  17 years and older: 73 - 180   • Dilute Prothrombin Time(dPT) 11/30/2020 31.1  0.0 - 55.0 sec Final   • dPT Confirm Ratio 11/30/2020 1.05  0.00 - 1.40 Ratio Final   • Thrombin Time 11/30/2020 19.3  0.0 - 23.0 sec Final   • PTT Lupus Anticoagulant 11/30/2020 33.2  0.0 - 51.9 sec Final   • Dilute Viper Venom Time 11/30/2020 31.2  0.0 - 47.0 sec Final   • Lupus Anticoagulant Reflex 11/30/2020 Comment:   Final    No lupus anticoagulant was detected.   • Beta-2 Glyco 1 IgG 11/30/2020 43* 0 - 20 GPI IgG units Final    The reference interval reflects a 3SD or 99th percentile interval,  which is thought to represent a potentially clinically significant  result in accordance with the  International Consensus Statement on  the classification criteria for definitive antiphospholipid syndrome  (APS). J Thromb Haem 2006;4:295-306.   • Beta-2 Glyco 1 IgA 11/30/2020 <9  0 - 25 GPI IgA units Final    The reference interval reflects a 3SD or 99th percentile interval,  which is thought to represent a potentially clinically significant  result in accordance with the International Consensus Statement on  the classification criteria for definitive antiphospholipid syndrome  (APS). J Thromb Haem 2006;4:295-306.   • Beta-2 Glyco 1 IgM 11/30/2020 <9  0 - 32 GPI IgM units Final    The reference interval reflects a 3SD or 99th percentile interval,  which is thought to represent a potentially clinically significant  result in accordance with the International Consensus Statement on  the classification criteria for definitive antiphospholipid syndrome  (APS). J Thromb Haem 2006;4:295-306.   • D-Dimer, Quantitative 11/30/2020 0.79* 0.00 - 0.56 MCGFEU/mL Final   • Factor II, DNA Analysis 11/30/2020 Normal  Normal Final   • WBC 11/30/2020 4.74  3.40 - 10.80 10*3/mm3 Final   • RBC 11/30/2020 5.64  4.14 - 5.80 10*6/mm3 Final   • Hemoglobin 11/30/2020 16.1  13.0 - 17.7 g/dL Final   • Hematocrit 11/30/2020 49.7  37.5 - 51.0 % Final   • MCV 11/30/2020 88.1  79.0 - 97.0 fL Final   • MCH 11/30/2020 28.5  26.6 - 33.0 pg Final   • MCHC 11/30/2020 32.4  31.5 - 35.7 g/dL Final   • RDW 11/30/2020 12.3  12.3 - 15.4 % Final   • RDW-SD 11/30/2020 39.6  37.0 - 54.0 fl Final   • MPV 11/30/2020 11.9  6.0 - 12.0 fL Final   • Platelets 11/30/2020 193  140 - 450 10*3/mm3 Final   • Neutrophil % 11/30/2020 45.0  42.7 - 76.0 % Final   • Lymphocyte % 11/30/2020 40.3  19.6 - 45.3 % Final   • Monocyte % 11/30/2020 10.5  5.0 - 12.0 % Final   • Eosinophil % 11/30/2020 2.7  0.3 - 6.2 % Final   • Basophil % 11/30/2020 1.1  0.0 - 1.5 % Final   • Immature Grans % 11/30/2020 0.4  0.0 - 0.5 % Final   • Neutrophils, Absolute 11/30/2020 2.13  1.70 - 7.00  10*3/mm3 Final   • Lymphocytes, Absolute 11/30/2020 1.91  0.70 - 3.10 10*3/mm3 Final   • Monocytes, Absolute 11/30/2020 0.50  0.10 - 0.90 10*3/mm3 Final   • Eosinophils, Absolute 11/30/2020 0.13  0.00 - 0.40 10*3/mm3 Final   • Basophils, Absolute 11/30/2020 0.05  0.00 - 0.20 10*3/mm3 Final   • Immature Grans, Absolute 11/30/2020 0.02  0.00 - 0.05 10*3/mm3 Final   • nRBC 11/30/2020 0.0  0.0 - 0.2 /100 WBC Final        No results found.    ASSESSMENT: The patient is a very pleasant 50 y.o. male  with right segmental pulmonary embolism    PROBLEM LIST:   1.  Right lower lobe pulmonary embolism:  A.  Presented with shortness of breath and chest pain  B.  CT PE protocol done on May 5, 2020 was questionable for right segmental pulmonary embolism  C.  Currently on Xarelto once a day  2.  Positive Betta-2 Glyco 1 IgG at 44 11/30/20  3.  Neuropathy    PLAN:  1.  I did go over the test results with the patient it did reveal 2 abnormalities positive D-dimer as well as positive beta-2 glycoprotein 1 IgG antibody.  2.  We will repeat his antibodies in 3 months if it remains positive will confirm the diagnosis of antifactor antibodies syndrome.  3.  If the patient has indeed antifactor antibody syndrome we will plan from lifelong anticoagulation.  4.  I will go reduce the dose to 10 mg daily as a maintenance treatment.  I explained to the patient the best data with antifactor antibody syndrome is with warfarin however patient is not interested in that he rather stay on Xarelto.  I think 10 mg daily is reasonable dose given his young age trying to minimize risk of bleeding however if he end up having a blood clot he will need to stay on 20 mg daily lifelong.  5.  The patient was advised to stop treatment 2 days prior to surgical procedures he might have spinal procedure and for that I recommend to stop it 3 days prior and resume as soon as he is cleared by the surgeon.  Marco A Suggs MD  12/11/2020

## 2021-01-15 DIAGNOSIS — I26.99 PULMONARY EMBOLISM ON RIGHT (HCC): Primary | ICD-10-CM

## 2021-01-15 NOTE — TELEPHONE ENCOUNTER
Patient called he wants to know if he can take 20 mg of the Xarelto instead of 10 mg symptoms are starting back chest pain, and leg pain. Please call.

## 2021-01-15 NOTE — TELEPHONE ENCOUNTER
Called and spoke with patient.  He advised he does not currently have chest pain or leg pain.  He said 3-4 days after he saw us last that he developed chestpain and pain in his leg.  He advised that he put himself back on the xarelto 20mg daily at that point and is going to be out this weekend needing a refill.  Discussed with Dr. Suggs who advised to send in the 20mg daily xarelto per patient wishes even though we had recommended the 10mg daily.  Patient verbalized understanding of increased risk of bleeding.  I advised him to notify us if symptoms return.

## 2021-03-09 ENCOUNTER — LAB (OUTPATIENT)
Dept: LAB | Facility: HOSPITAL | Age: 51
End: 2021-03-09

## 2021-03-09 DIAGNOSIS — I26.99 PULMONARY EMBOLISM ON RIGHT (HCC): ICD-10-CM

## 2021-03-09 PROCEDURE — 86146 BETA-2 GLYCOPROTEIN ANTIBODY: CPT

## 2021-03-09 PROCEDURE — 36415 COLL VENOUS BLD VENIPUNCTURE: CPT

## 2021-03-11 ENCOUNTER — OFFICE VISIT (OUTPATIENT)
Dept: ONCOLOGY | Facility: CLINIC | Age: 51
End: 2021-03-11

## 2021-03-11 VITALS
DIASTOLIC BLOOD PRESSURE: 62 MMHG | SYSTOLIC BLOOD PRESSURE: 130 MMHG | RESPIRATION RATE: 16 BRPM | WEIGHT: 183 LBS | HEIGHT: 70 IN | TEMPERATURE: 96.2 F | HEART RATE: 98 BPM | OXYGEN SATURATION: 98 % | BODY MASS INDEX: 26.2 KG/M2

## 2021-03-11 DIAGNOSIS — I26.99 PULMONARY EMBOLISM ON RIGHT (HCC): Primary | ICD-10-CM

## 2021-03-11 PROCEDURE — 99214 OFFICE O/P EST MOD 30 MIN: CPT | Performed by: INTERNAL MEDICINE

## 2021-03-11 RX ORDER — PREGABALIN 150 MG/1
150 CAPSULE ORAL EVERY MORNING
COMMUNITY
Start: 2021-03-05

## 2021-03-11 NOTE — PROGRESS NOTES
DATE OF VISIT: 3/11/2021    REASON FOR VISIT: Followup for right-sided pulmonary embolism     HISTORY OF PRESENT ILLNESS: The patient is a very pleasant 50 y.o. male  with past medical history significant for PE diagnosed May 2020. The  patient is here today for scheduled follow-up visit    SUBJECTIVE: The patient is here today by himself.  He could not tolerate Xarelto 10 mg daily secondary to new pain that was similar to his initial pain when he was diagnosed with a venous thrombotic event so he did increase his dose to 20 mg daily.  Denies any bleedings.    PAST MEDICAL HISTORY/SOCIAL HISTORY/FAMILY HISTORY: Reviewed by me and unchanged from my documentation done on 03/11/21.    Review of Systems   Constitutional: Negative for activity change, appetite change, chills, fatigue, fever and unexpected weight change.   HENT: Negative for hearing loss, mouth sores, nosebleeds, sore throat and trouble swallowing.    Eyes: Negative for visual disturbance.   Respiratory: Negative for cough, chest tightness, shortness of breath and wheezing.    Cardiovascular: Negative for chest pain, palpitations and leg swelling.   Gastrointestinal: Negative for abdominal distention, abdominal pain, blood in stool, constipation, diarrhea, nausea, rectal pain and vomiting.   Endocrine: Negative for cold intolerance and heat intolerance.   Genitourinary: Negative for difficulty urinating, dysuria, frequency and urgency.   Musculoskeletal: Negative for arthralgias, back pain, gait problem, joint swelling and myalgias.   Skin: Negative for rash.   Neurological: Negative for dizziness, tremors, syncope, weakness, light-headedness, numbness and headaches.   Hematological: Negative for adenopathy. Does not bruise/bleed easily.   Psychiatric/Behavioral: Negative for confusion, sleep disturbance and suicidal ideas. The patient is not nervous/anxious.          Current Outpatient Medications:   •  cetirizine (ZyrTEC Allergy) 10 MG tablet, Take  by  "mouth Daily., Disp: , Rfl:   •  gabapentin (NEURONTIN) 100 MG capsule, Take 100 mg by mouth Daily., Disp: , Rfl:   •  pregabalin (Lyrica) 100 MG capsule, Take 150 mg by mouth 2 (two) times a day., Disp: , Rfl:   •  pregabalin (LYRICA) 150 MG capsule, , Disp: , Rfl:   •  traMADol (ULTRAM) 50 MG tablet, Take 50 mg by mouth Every Night., Disp: , Rfl:   •  vitamin D3 (vitamin d) 125 MCG (5000 UT) capsule capsule, Take 5,000 Units by mouth Daily., Disp: , Rfl:   •  zolpidem (AMBIEN) 10 MG tablet, Take 10 mg by mouth., Disp: , Rfl:     PHYSICAL EXAMINATION:   /62   Pulse 98   Temp 96.2 °F (35.7 °C) (Temporal)   Resp 16   Ht 177.8 cm (70\")   Wt 83 kg (183 lb)   SpO2 98%   BMI 26.26 kg/m²    Pain Score    03/11/21 0909   PainSc: 0-No pain       ECOG Performance Status: 1 - Symptomatic but completely ambulatory  General Appearance:  alert, cooperative, no apparent distress and appears stated age   Neurologic/Psychiatric: A&O x 3, gait steady, appropriate affect, strength 5/5 in all muscle groups   HEENT:  Normocephalic, without obvious abnormality, mucous membranes moist   Neck: Supple, symmetrical, trachea midline, no adenopathy;  No thyromegaly, masses, or tenderness   Lungs:   Clear to auscultation bilaterally; respirations regular, even, and unlabored bilaterally   Heart:  Regular rate and rhythm, no murmurs appreciated   Abdomen:   Soft, non-tender, non-distended and no organomegaly   Lymph nodes: No cervical, supraclavicular, inguinal or axillary adenopathy noted   Extremities: Normal, atraumatic; no clubbing, cyanosis, or edema    Skin: No rashes, ulcers, or suspicious lesions noted     No visits with results within 2 Week(s) from this visit.   Latest known visit with results is:   Lab on 11/30/2020   Component Date Value Ref Range Status   • Anticardiolipin IgG 11/30/2020 <9  0 - 14 GPL U/mL Final                              Negative:              <15                            Indeterminate:     15 - " 20                            Low-Med Positive: >20 - 80                            High Positive:         >80   • Anticardiolipin IgM 11/30/2020 <9  0 - 12 MPL U/mL Final                              Negative:              <13                            Indeterminate:     13 - 20                            Low-Med Positive: >20 - 80                            High Positive:         >80   • AntiThromb III Func 11/30/2020 108  75 - 135 % Final    Direct Xa inhibitor anticoagulants such as rivaroxaban, apixaban and  edoxaban will lead to spuriously elevated antithrombin activity  levels possibly masking a deficiency.   • Factor V Leiden 11/30/2020 Normal  Normal Final   • Protein C Antigen 11/30/2020 79  60 - 150 % Final   • Protein S Ag, Total 11/30/2020 90  60 - 150 % Final    This test was developed and its performance characteristics  determined by [facility]. It has not been cleared or approved  by the Food and Drug Administration.   • Protein S Ag, Free 11/30/2020 108  57 - 157 % Final    This test was developed and its performance characteristics  determined by [facility]. It has not been cleared or approved  by the Food and Drug Administration.   • Protein S-Functional 11/30/2020 110  63 - 140 % Final    Protein S activity may be falsely increased (masking an abnormal, low  result) in patients receiving direct Xa inhibitor (e.g., rivaroxaban,  apixaban, edoxaban) or a direct thrombin inhibitor (e.g., dabigatran)  anticoagulant treatment due to assay interference by these drugs.   • Prt C Activity (Chromogenic) 11/30/2020 102  % Final    Reference Range:  17 years and older: 73 - 180   • Dilute Prothrombin Time(dPT) 11/30/2020 31.1  0.0 - 55.0 sec Final   • dPT Confirm Ratio 11/30/2020 1.05  0.00 - 1.40 Ratio Final   • Thrombin Time 11/30/2020 19.3  0.0 - 23.0 sec Final   • PTT Lupus Anticoagulant 11/30/2020 33.2  0.0 - 51.9 sec Final   • Dilute Viper Venom Time 11/30/2020 31.2  0.0 - 47.0 sec Final   • Lupus  Anticoagulant Reflex 11/30/2020 Comment:   Final    No lupus anticoagulant was detected.   • Beta-2 Glyco 1 IgG 11/30/2020 43* 0 - 20 GPI IgG units Final    The reference interval reflects a 3SD or 99th percentile interval,  which is thought to represent a potentially clinically significant  result in accordance with the International Consensus Statement on  the classification criteria for definitive antiphospholipid syndrome  (APS). J Thromb Haem 2006;4:295-306.   • Beta-2 Glyco 1 IgA 11/30/2020 <9  0 - 25 GPI IgA units Final    The reference interval reflects a 3SD or 99th percentile interval,  which is thought to represent a potentially clinically significant  result in accordance with the International Consensus Statement on  the classification criteria for definitive antiphospholipid syndrome  (APS). J Thromb Haem 2006;4:295-306.   • Beta-2 Glyco 1 IgM 11/30/2020 <9  0 - 32 GPI IgM units Final    The reference interval reflects a 3SD or 99th percentile interval,  which is thought to represent a potentially clinically significant  result in accordance with the International Consensus Statement on  the classification criteria for definitive antiphospholipid syndrome  (APS). J Thromb Haem 2006;4:295-306.   • D-Dimer, Quantitative 11/30/2020 0.79* 0.00 - 0.56 MCGFEU/mL Final   • Factor II, DNA Analysis 11/30/2020 Normal  Normal Final   • WBC 11/30/2020 4.74  3.40 - 10.80 10*3/mm3 Final   • RBC 11/30/2020 5.64  4.14 - 5.80 10*6/mm3 Final   • Hemoglobin 11/30/2020 16.1  13.0 - 17.7 g/dL Final   • Hematocrit 11/30/2020 49.7  37.5 - 51.0 % Final   • MCV 11/30/2020 88.1  79.0 - 97.0 fL Final   • MCH 11/30/2020 28.5  26.6 - 33.0 pg Final   • MCHC 11/30/2020 32.4  31.5 - 35.7 g/dL Final   • RDW 11/30/2020 12.3  12.3 - 15.4 % Final   • RDW-SD 11/30/2020 39.6  37.0 - 54.0 fl Final   • MPV 11/30/2020 11.9  6.0 - 12.0 fL Final   • Platelets 11/30/2020 193  140 - 450 10*3/mm3 Final   • Neutrophil % 11/30/2020 45.0  42.7 - 76.0  % Final   • Lymphocyte % 11/30/2020 40.3  19.6 - 45.3 % Final   • Monocyte % 11/30/2020 10.5  5.0 - 12.0 % Final   • Eosinophil % 11/30/2020 2.7  0.3 - 6.2 % Final   • Basophil % 11/30/2020 1.1  0.0 - 1.5 % Final   • Immature Grans % 11/30/2020 0.4  0.0 - 0.5 % Final   • Neutrophils, Absolute 11/30/2020 2.13  1.70 - 7.00 10*3/mm3 Final   • Lymphocytes, Absolute 11/30/2020 1.91  0.70 - 3.10 10*3/mm3 Final   • Monocytes, Absolute 11/30/2020 0.50  0.10 - 0.90 10*3/mm3 Final   • Eosinophils, Absolute 11/30/2020 0.13  0.00 - 0.40 10*3/mm3 Final   • Basophils, Absolute 11/30/2020 0.05  0.00 - 0.20 10*3/mm3 Final   • Immature Grans, Absolute 11/30/2020 0.02  0.00 - 0.05 10*3/mm3 Final   • nRBC 11/30/2020 0.0  0.0 - 0.2 /100 WBC Final        No results found.    ASSESSMENT: The patient is a very pleasant 50 y.o. male  with right segmental pulmonary embolism    PROBLEM LIST:   1.  Right lower lobe pulmonary embolism:  A.  Presented with shortness of breath and chest pain  B.  CT PE protocol done on May 5, 2020 was questionable for right segmental pulmonary embolism  C.  Currently on Xarelto once a day  2.  Positive Betta-2 Glyco 1 IgG at 44 11/30/20  3.  Neuropathy    PLAN:  1.  I we will follow-up on blood work results from today specifically has beta-2 glycoprotein 1 IgG antibody.  If it remains positive he would be treated with lifelong anticoagulation.  2.  I will repeat his beta-2 glycoprotein 1 IgG level while he is on Xarelto.  3.  The patient is willing to try Xarelto 15 mg daily he did not feel comfortable lowering the dose all the way to 10 mg daily.  I will give a new prescription today.  4.  Patient was advised to exercise and stay active.  5.  The patient was advised to stop treatment 2 days prior to surgical procedures and resume 1 day after.  6.  He will follow-up with me in 6 months.   Marco A Suggs MD  3/11/2021

## 2021-03-12 ENCOUNTER — TELEPHONE (OUTPATIENT)
Dept: ONCOLOGY | Facility: CLINIC | Age: 51
End: 2021-03-12

## 2021-03-12 LAB
B2 GLYCOPROT1 IGA SER-ACNC: <9 GPI IGA UNITS (ref 0–25)
B2 GLYCOPROT1 IGG SER-ACNC: 42 GPI IGG UNITS (ref 0–20)
B2 GLYCOPROT1 IGM SER-ACNC: <9 GPI IGM UNITS (ref 0–32)

## 2021-03-12 NOTE — TELEPHONE ENCOUNTER
Talked to Dr. Suggs on the phone.  He advised me to let patient know his labs were still positive and would need to be on lifelong anticoagulation.  Tried to call patient but unable to reach.  Will try again Monday.

## 2021-03-12 NOTE — TELEPHONE ENCOUNTER
----- Message from Myriam Ang RN sent at 3/11/2021  9:35 AM EST -----  Regarding: call with lab results

## 2021-03-15 NOTE — TELEPHONE ENCOUNTER
Called and let patient know about his positive lab result and need to stay on lifelong anticoagulation.  While on phone he asked if he can drink green tea with this blood thinner.  Message sent to pharmacy to check to see if any contraindications.

## 2021-03-29 ENCOUNTER — TRANSCRIBE ORDERS (OUTPATIENT)
Dept: ADMINISTRATIVE | Facility: HOSPITAL | Age: 51
End: 2021-03-29

## 2021-03-29 DIAGNOSIS — R06.02 SHORTNESS OF BREATH: Primary | ICD-10-CM

## 2021-04-21 ENCOUNTER — HOSPITAL ENCOUNTER (OUTPATIENT)
Dept: CT IMAGING | Facility: HOSPITAL | Age: 51
Discharge: HOME | End: 2021-04-21
Admitting: STUDENT IN AN ORGANIZED HEALTH CARE EDUCATION/TRAINING PROGRAM

## 2021-04-21 DIAGNOSIS — R06.02 SHORTNESS OF BREATH: ICD-10-CM

## 2021-04-21 PROCEDURE — 0 IOPAMIDOL PER 1 ML: Performed by: STUDENT IN AN ORGANIZED HEALTH CARE EDUCATION/TRAINING PROGRAM

## 2021-04-21 PROCEDURE — 71275 CT ANGIOGRAPHY CHEST: CPT

## 2021-04-21 RX ADMIN — IOPAMIDOL 100 ML: 755 INJECTION, SOLUTION INTRAVENOUS at 08:56

## 2021-05-04 ENCOUNTER — HOSPITAL ENCOUNTER (EMERGENCY)
Facility: HOSPITAL | Age: 51
Discharge: HOME | End: 2021-05-04
Attending: EMERGENCY MEDICINE | Admitting: EMERGENCY MEDICINE

## 2021-05-04 ENCOUNTER — APPOINTMENT (OUTPATIENT)
Dept: CARDIOLOGY | Facility: HOSPITAL | Age: 51
End: 2021-05-04

## 2021-05-04 ENCOUNTER — APPOINTMENT (OUTPATIENT)
Dept: GENERAL RADIOLOGY | Facility: HOSPITAL | Age: 51
End: 2021-05-04

## 2021-05-04 ENCOUNTER — APPOINTMENT (OUTPATIENT)
Dept: CT IMAGING | Facility: HOSPITAL | Age: 51
End: 2021-05-04

## 2021-05-04 VITALS
HEART RATE: 72 BPM | SYSTOLIC BLOOD PRESSURE: 130 MMHG | HEIGHT: 70 IN | OXYGEN SATURATION: 97 % | WEIGHT: 175 LBS | DIASTOLIC BLOOD PRESSURE: 77 MMHG | BODY MASS INDEX: 25.05 KG/M2 | RESPIRATION RATE: 18 BRPM | TEMPERATURE: 98.9 F

## 2021-05-04 DIAGNOSIS — M79.651 RIGHT THIGH PAIN: ICD-10-CM

## 2021-05-04 DIAGNOSIS — R04.2 HEMOPTYSIS: Primary | ICD-10-CM

## 2021-05-04 LAB
ALBUMIN SERPL-MCNC: 4.2 G/DL (ref 3.5–5.2)
ALBUMIN/GLOB SERPL: 1.5 G/DL
ALP SERPL-CCNC: 82 U/L (ref 39–117)
ALT SERPL W P-5'-P-CCNC: 11 U/L (ref 1–41)
ANION GAP SERPL CALCULATED.3IONS-SCNC: 9 MMOL/L (ref 5–15)
AST SERPL-CCNC: 21 U/L (ref 1–40)
BASOPHILS # BLD AUTO: 0.02 10*3/MM3 (ref 0–0.2)
BASOPHILS NFR BLD AUTO: 0.4 % (ref 0–1.5)
BH CV ECHO MEAS - BSA(HAYCOCK): 2 M^2
BH CV ECHO MEAS - BSA: 2 M^2
BH CV ECHO MEAS - BZI_BMI: 25.1 KILOGRAMS/M^2
BH CV ECHO MEAS - BZI_METRIC_HEIGHT: 177.8 CM
BH CV ECHO MEAS - BZI_METRIC_WEIGHT: 79.4 KG
BH CV LOWER VASCULAR LEFT COMMON FEMORAL AUGMENT: NORMAL
BH CV LOWER VASCULAR LEFT COMMON FEMORAL COMPRESS: NORMAL
BH CV LOWER VASCULAR LEFT COMMON FEMORAL PHASIC: NORMAL
BH CV LOWER VASCULAR LEFT COMMON FEMORAL SPONT: NORMAL
BH CV LOWER VASCULAR LEFT SAPHENOFEMORAL JUNCTION AUGMENT: NORMAL
BH CV LOWER VASCULAR LEFT SAPHENOFEMORAL JUNCTION COMPRESS: NORMAL
BH CV LOWER VASCULAR LEFT SAPHENOFEMORAL JUNCTION PHASIC: NORMAL
BH CV LOWER VASCULAR LEFT SAPHENOFEMORAL JUNCTION SPONT: NORMAL
BH CV LOWER VASCULAR RIGHT COMMON FEMORAL AUGMENT: NORMAL
BH CV LOWER VASCULAR RIGHT COMMON FEMORAL COMPRESS: NORMAL
BH CV LOWER VASCULAR RIGHT COMMON FEMORAL PHASIC: NORMAL
BH CV LOWER VASCULAR RIGHT COMMON FEMORAL SPONT: NORMAL
BH CV LOWER VASCULAR RIGHT DISTAL FEMORAL AUGMENT: NORMAL
BH CV LOWER VASCULAR RIGHT DISTAL FEMORAL COMPRESS: NORMAL
BH CV LOWER VASCULAR RIGHT DISTAL FEMORAL PHASIC: NORMAL
BH CV LOWER VASCULAR RIGHT DISTAL FEMORAL SPONT: NORMAL
BH CV LOWER VASCULAR RIGHT GASTRONEMIUS COMPRESS: NORMAL
BH CV LOWER VASCULAR RIGHT GREATER SAPH AK COMPRESS: NORMAL
BH CV LOWER VASCULAR RIGHT GREATER SAPH BK COMPRESS: NORMAL
BH CV LOWER VASCULAR RIGHT LESSER SAPH COMPRESS: NORMAL
BH CV LOWER VASCULAR RIGHT MID FEMORAL AUGMENT: NORMAL
BH CV LOWER VASCULAR RIGHT MID FEMORAL COMPRESS: NORMAL
BH CV LOWER VASCULAR RIGHT MID FEMORAL PHASIC: NORMAL
BH CV LOWER VASCULAR RIGHT MID FEMORAL SPONT: NORMAL
BH CV LOWER VASCULAR RIGHT PERONEAL AUGMENT: NORMAL
BH CV LOWER VASCULAR RIGHT PERONEAL COMPRESS: NORMAL
BH CV LOWER VASCULAR RIGHT POPLITEAL AUGMENT: NORMAL
BH CV LOWER VASCULAR RIGHT POPLITEAL COMPRESS: NORMAL
BH CV LOWER VASCULAR RIGHT POPLITEAL PHASIC: NORMAL
BH CV LOWER VASCULAR RIGHT POPLITEAL SPONT: NORMAL
BH CV LOWER VASCULAR RIGHT POSTERIOR TIBIAL AUGMENT: NORMAL
BH CV LOWER VASCULAR RIGHT POSTERIOR TIBIAL COMPRESS: NORMAL
BH CV LOWER VASCULAR RIGHT PROFUNDA FEMORAL AUGMENT: NORMAL
BH CV LOWER VASCULAR RIGHT PROFUNDA FEMORAL PHASIC: NORMAL
BH CV LOWER VASCULAR RIGHT PROFUNDA FEMORAL SPONT: NORMAL
BH CV LOWER VASCULAR RIGHT PROXIMAL FEMORAL AUGMENT: NORMAL
BH CV LOWER VASCULAR RIGHT PROXIMAL FEMORAL COMPRESS: NORMAL
BH CV LOWER VASCULAR RIGHT PROXIMAL FEMORAL PHASIC: NORMAL
BH CV LOWER VASCULAR RIGHT PROXIMAL FEMORAL SPONT: NORMAL
BH CV LOWER VASCULAR RIGHT SAPHENOFEMORAL JUNCTION AUGMENT: NORMAL
BH CV LOWER VASCULAR RIGHT SAPHENOFEMORAL JUNCTION COMPRESS: NORMAL
BH CV LOWER VASCULAR RIGHT SAPHENOFEMORAL JUNCTION PHASIC: NORMAL
BH CV LOWER VASCULAR RIGHT SAPHENOFEMORAL JUNCTION SPONT: NORMAL
BILIRUB SERPL-MCNC: 0.5 MG/DL (ref 0–1.2)
BUN SERPL-MCNC: 12 MG/DL (ref 6–20)
BUN/CREAT SERPL: 11.2 (ref 7–25)
CALCIUM SPEC-SCNC: 9.8 MG/DL (ref 8.6–10.5)
CHLORIDE SERPL-SCNC: 107 MMOL/L (ref 98–107)
CO2 SERPL-SCNC: 27 MMOL/L (ref 22–29)
CREAT SERPL-MCNC: 1.07 MG/DL (ref 0.76–1.27)
DEPRECATED RDW RBC AUTO: 40.4 FL (ref 37–54)
EOSINOPHIL # BLD AUTO: 0.05 10*3/MM3 (ref 0–0.4)
EOSINOPHIL NFR BLD AUTO: 0.9 % (ref 0.3–6.2)
ERYTHROCYTE [DISTWIDTH] IN BLOOD BY AUTOMATED COUNT: 12.4 % (ref 12.3–15.4)
GFR SERPL CREATININE-BSD FRML MDRD: 73 ML/MIN/1.73
GLOBULIN UR ELPH-MCNC: 2.8 GM/DL
GLUCOSE SERPL-MCNC: 124 MG/DL (ref 65–99)
HCT VFR BLD AUTO: 48.2 % (ref 37.5–51)
HGB BLD-MCNC: 15.8 G/DL (ref 13–17.7)
HOLD SPECIMEN: NORMAL
IMM GRANULOCYTES # BLD AUTO: 0.01 10*3/MM3 (ref 0–0.05)
IMM GRANULOCYTES NFR BLD AUTO: 0.2 % (ref 0–0.5)
LIPASE SERPL-CCNC: 35 U/L (ref 13–60)
LYMPHOCYTES # BLD AUTO: 1.55 10*3/MM3 (ref 0.7–3.1)
LYMPHOCYTES NFR BLD AUTO: 28.3 % (ref 19.6–45.3)
MCH RBC QN AUTO: 29.4 PG (ref 26.6–33)
MCHC RBC AUTO-ENTMCNC: 32.8 G/DL (ref 31.5–35.7)
MCV RBC AUTO: 89.8 FL (ref 79–97)
MONOCYTES # BLD AUTO: 0.53 10*3/MM3 (ref 0.1–0.9)
MONOCYTES NFR BLD AUTO: 9.7 % (ref 5–12)
NEUTROPHILS NFR BLD AUTO: 3.32 10*3/MM3 (ref 1.7–7)
NEUTROPHILS NFR BLD AUTO: 60.5 % (ref 42.7–76)
NRBC BLD AUTO-RTO: 0 /100 WBC (ref 0–0.2)
NT-PROBNP SERPL-MCNC: 45.4 PG/ML (ref 0–450)
PLATELET # BLD AUTO: 193 10*3/MM3 (ref 140–450)
PMV BLD AUTO: 10.6 FL (ref 6–12)
POTASSIUM SERPL-SCNC: 4.2 MMOL/L (ref 3.5–5.2)
PROT SERPL-MCNC: 7 G/DL (ref 6–8.5)
RBC # BLD AUTO: 5.37 10*6/MM3 (ref 4.14–5.8)
SODIUM SERPL-SCNC: 143 MMOL/L (ref 136–145)
TROPONIN T SERPL-MCNC: <0.01 NG/ML (ref 0–0.03)
TROPONIN T SERPL-MCNC: <0.01 NG/ML (ref 0–0.03)
WBC # BLD AUTO: 5.48 10*3/MM3 (ref 3.4–10.8)
WHOLE BLOOD HOLD SPECIMEN: NORMAL
WHOLE BLOOD HOLD SPECIMEN: NORMAL

## 2021-05-04 PROCEDURE — 93005 ELECTROCARDIOGRAM TRACING: CPT | Performed by: EMERGENCY MEDICINE

## 2021-05-04 PROCEDURE — 83690 ASSAY OF LIPASE: CPT

## 2021-05-04 PROCEDURE — 71275 CT ANGIOGRAPHY CHEST: CPT

## 2021-05-04 PROCEDURE — 80053 COMPREHEN METABOLIC PANEL: CPT

## 2021-05-04 PROCEDURE — 93005 ELECTROCARDIOGRAM TRACING: CPT

## 2021-05-04 PROCEDURE — 71045 X-RAY EXAM CHEST 1 VIEW: CPT

## 2021-05-04 PROCEDURE — 0 IOPAMIDOL PER 1 ML: Performed by: EMERGENCY MEDICINE

## 2021-05-04 PROCEDURE — 93971 EXTREMITY STUDY: CPT

## 2021-05-04 PROCEDURE — 84484 ASSAY OF TROPONIN QUANT: CPT

## 2021-05-04 PROCEDURE — C9803 HOPD COVID-19 SPEC COLLECT: HCPCS | Performed by: PHYSICIAN ASSISTANT

## 2021-05-04 PROCEDURE — 99284 EMERGENCY DEPT VISIT MOD MDM: CPT

## 2021-05-04 PROCEDURE — U0004 COV-19 TEST NON-CDC HGH THRU: HCPCS | Performed by: PHYSICIAN ASSISTANT

## 2021-05-04 PROCEDURE — 84484 ASSAY OF TROPONIN QUANT: CPT | Performed by: EMERGENCY MEDICINE

## 2021-05-04 PROCEDURE — 83880 ASSAY OF NATRIURETIC PEPTIDE: CPT

## 2021-05-04 PROCEDURE — 85025 COMPLETE CBC W/AUTO DIFF WBC: CPT

## 2021-05-04 PROCEDURE — 93971 EXTREMITY STUDY: CPT | Performed by: INTERNAL MEDICINE

## 2021-05-04 RX ORDER — ASPIRIN 81 MG/1
324 TABLET, CHEWABLE ORAL ONCE
Status: DISCONTINUED | OUTPATIENT
Start: 2021-05-04 | End: 2021-05-04

## 2021-05-04 RX ORDER — CYCLOBENZAPRINE HCL 10 MG
10 TABLET ORAL 3 TIMES DAILY PRN
Qty: 21 TABLET | Refills: 0 | Status: SHIPPED | OUTPATIENT
Start: 2021-05-04 | End: 2021-09-17

## 2021-05-04 RX ORDER — SODIUM CHLORIDE 0.9 % (FLUSH) 0.9 %
10 SYRINGE (ML) INJECTION AS NEEDED
Status: DISCONTINUED | OUTPATIENT
Start: 2021-05-04 | End: 2021-05-04 | Stop reason: HOSPADM

## 2021-05-04 RX ADMIN — IOPAMIDOL 69 ML: 755 INJECTION, SOLUTION INTRAVENOUS at 16:40

## 2021-05-05 ENCOUNTER — TELEPHONE (OUTPATIENT)
Dept: ONCOLOGY | Facility: CLINIC | Age: 51
End: 2021-05-05

## 2021-05-05 LAB
QT INTERVAL: 344 MS
QT INTERVAL: 348 MS
QTC INTERVAL: 406 MS
QTC INTERVAL: 420 MS
SARS-COV-2 RNA NOSE QL NAA+PROBE: NOT DETECTED

## 2021-05-05 NOTE — TELEPHONE ENCOUNTER
I reviewed records and discussed with lakeshia Miller who advised that she agreed with my assessment that the ct angio was negative, labs were normal and to f/u with pcp.  I called patient.  No answer and left message with this information for him.   ----- Message -----   From: Martha Cortes   Sent: 5/5/2021   9:42 AM EDT   To: Myriam Ang RN   Subject: Call PT                                           Patient ended up going to the ER yesterday. Patient would like Dr. Suggs to go over all his labs/ scans to make sure he did not see anything. See what he thought. Have a second set of eyes. [phone]

## 2021-09-17 ENCOUNTER — OFFICE VISIT (OUTPATIENT)
Dept: ONCOLOGY | Facility: CLINIC | Age: 51
End: 2021-09-17

## 2021-09-17 VITALS
RESPIRATION RATE: 16 BRPM | SYSTOLIC BLOOD PRESSURE: 125 MMHG | BODY MASS INDEX: 23.48 KG/M2 | HEIGHT: 70 IN | OXYGEN SATURATION: 98 % | TEMPERATURE: 97.1 F | WEIGHT: 164 LBS | DIASTOLIC BLOOD PRESSURE: 75 MMHG | HEART RATE: 101 BPM

## 2021-09-17 DIAGNOSIS — I26.99 PULMONARY EMBOLISM ON RIGHT (HCC): Primary | ICD-10-CM

## 2021-09-17 DIAGNOSIS — I26.99 PULMONARY EMBOLISM ON RIGHT (HCC): ICD-10-CM

## 2021-09-17 PROCEDURE — 99214 OFFICE O/P EST MOD 30 MIN: CPT | Performed by: INTERNAL MEDICINE

## 2021-09-17 RX ORDER — PREDNISONE 10 MG/1
20 TABLET ORAL DAILY
COMMUNITY
Start: 2021-07-10 | End: 2022-07-05

## 2021-09-17 NOTE — PROGRESS NOTES
DATE OF VISIT: 9/17/2021    REASON FOR VISIT: Followup for right-sided pulmonary embolism     HISTORY OF PRESENT ILLNESS: The patient is a very pleasant 50 y.o. male  with past medical history significant for PE diagnosed May 2020. The  patient is here today for scheduled follow-up visit    SUBJECTIVE: The patient is here today by himself.  He tried to go down to 10 mg that he started to have some unusual respiratory symptoms, no shortness of breath but he felt his lungs were wet as if he has a cold.  When he increased the dose his symptoms went away.    PAST MEDICAL HISTORY/SOCIAL HISTORY/FAMILY HISTORY: Reviewed by me and unchanged from my documentation done on 09/17/21.    Review of Systems   Constitutional: Negative for activity change, appetite change, chills, fatigue, fever and unexpected weight change.   HENT: Negative for hearing loss, mouth sores, nosebleeds, sore throat and trouble swallowing.    Eyes: Negative for visual disturbance.   Respiratory: Negative for cough, chest tightness, shortness of breath and wheezing.    Cardiovascular: Negative for chest pain, palpitations and leg swelling.   Gastrointestinal: Negative for abdominal distention, abdominal pain, blood in stool, constipation, diarrhea, nausea, rectal pain and vomiting.   Endocrine: Negative for cold intolerance and heat intolerance.   Genitourinary: Negative for difficulty urinating, dysuria, frequency and urgency.   Musculoskeletal: Negative for arthralgias, back pain, gait problem, joint swelling and myalgias.   Skin: Negative for rash.   Neurological: Negative for dizziness, tremors, syncope, weakness, light-headedness, numbness and headaches.   Hematological: Negative for adenopathy. Does not bruise/bleed easily.   Psychiatric/Behavioral: Negative for confusion, sleep disturbance and suicidal ideas. The patient is not nervous/anxious.          Current Outpatient Medications:   •  cetirizine (ZyrTEC Allergy) 10 MG tablet, Take  by mouth  "Daily., Disp: , Rfl:   •  predniSONE (DELTASONE) 10 MG tablet, Take 20 mg by mouth Daily., Disp: , Rfl:   •  pregabalin (Lyrica) 100 MG capsule, Take 150 mg by mouth 2 (two) times a day., Disp: , Rfl:   •  pregabalin (LYRICA) 150 MG capsule, , Disp: , Rfl:   •  rivaroxaban (XARELTO) 15 MG tablet, Take 1 tablet by mouth Daily., Disp: 30 tablet, Rfl: 5  •  vitamin D3 (vitamin d) 125 MCG (5000 UT) capsule capsule, Take 5,000 Units by mouth Daily., Disp: , Rfl:   •  zolpidem (AMBIEN) 10 MG tablet, Take 10 mg by mouth., Disp: , Rfl:     PHYSICAL EXAMINATION:   /75   Pulse 101   Temp 97.1 °F (36.2 °C) (Temporal)   Resp 16   Ht 177.8 cm (70\")   Wt 74.4 kg (164 lb)   SpO2 98%   BMI 23.53 kg/m²    Pain Score    09/17/21 1457   PainSc: 0-No pain       ECOG Performance Status: 1 - Symptomatic but completely ambulatory  General Appearance:  alert, cooperative, no apparent distress and appears stated age   Neurologic/Psychiatric: A&O x 3, gait steady, appropriate affect, strength 5/5 in all muscle groups   HEENT:  Normocephalic, without obvious abnormality, mucous membranes moist   Neck: Supple, symmetrical, trachea midline, no adenopathy;  No thyromegaly, masses, or tenderness   Lungs:   Clear to auscultation bilaterally; respirations regular, even, and unlabored bilaterally   Heart:  Regular rate and rhythm, no murmurs appreciated   Abdomen:   Soft, non-tender, non-distended and no organomegaly   Lymph nodes: No cervical, supraclavicular, inguinal or axillary adenopathy noted   Extremities: Normal, atraumatic; no clubbing, cyanosis, or edema    Skin: No rashes, ulcers, or suspicious lesions noted     No visits with results within 2 Week(s) from this visit.   Latest known visit with results is:   Admission on 05/04/2021, Discharged on 05/04/2021   Component Date Value Ref Range Status   • QT Interval 05/04/2021 344  ms Final   • QTC Interval 05/04/2021 420  ms Final   • QT Interval 05/04/2021 348  ms Final   • QTC " Interval 05/04/2021 406  ms Final   • Troponin T 05/04/2021 <0.010  0.000 - 0.030 ng/mL Final   • Troponin T 05/04/2021 <0.010  0.000 - 0.030 ng/mL Final   • Glucose 05/04/2021 124* 65 - 99 mg/dL Final   • BUN 05/04/2021 12  6 - 20 mg/dL Final   • Creatinine 05/04/2021 1.07  0.76 - 1.27 mg/dL Final   • Sodium 05/04/2021 143  136 - 145 mmol/L Final   • Potassium 05/04/2021 4.2  3.5 - 5.2 mmol/L Final   • Chloride 05/04/2021 107  98 - 107 mmol/L Final   • CO2 05/04/2021 27.0  22.0 - 29.0 mmol/L Final   • Calcium 05/04/2021 9.8  8.6 - 10.5 mg/dL Final   • Total Protein 05/04/2021 7.0  6.0 - 8.5 g/dL Final   • Albumin 05/04/2021 4.20  3.50 - 5.20 g/dL Final   • ALT (SGPT) 05/04/2021 11  1 - 41 U/L Final   • AST (SGOT) 05/04/2021 21  1 - 40 U/L Final   • Alkaline Phosphatase 05/04/2021 82  39 - 117 U/L Final   • Total Bilirubin 05/04/2021 0.5  0.0 - 1.2 mg/dL Final   • eGFR Non African Amer 05/04/2021 73  >60 mL/min/1.73 Final   • Globulin 05/04/2021 2.8  gm/dL Final   • A/G Ratio 05/04/2021 1.5  g/dL Final   • BUN/Creatinine Ratio 05/04/2021 11.2  7.0 - 25.0 Final   • Anion Gap 05/04/2021 9.0  5.0 - 15.0 mmol/L Final   • Lipase 05/04/2021 35  13 - 60 U/L Final   • proBNP 05/04/2021 45.4  0.0 - 450.0 pg/mL Final   • Extra Tube 05/04/2021 hold for add-on   Final    Auto resulted   • Extra Tube 05/04/2021 Hold for add-ons.   Final    Auto resulted.   • Extra Tube 05/04/2021 hold for add-on   Final    Auto resulted   • Extra Tube 05/04/2021 Hold for add-ons.   Final    Auto resulted.   • Extra Tube 05/04/2021 Hold for add-ons.   Final    Auto resulted.   • WBC 05/04/2021 5.48  3.40 - 10.80 10*3/mm3 Final   • RBC 05/04/2021 5.37  4.14 - 5.80 10*6/mm3 Final   • Hemoglobin 05/04/2021 15.8  13.0 - 17.7 g/dL Final   • Hematocrit 05/04/2021 48.2  37.5 - 51.0 % Final   • MCV 05/04/2021 89.8  79.0 - 97.0 fL Final   • MCH 05/04/2021 29.4  26.6 - 33.0 pg Final   • MCHC 05/04/2021 32.8  31.5 - 35.7 g/dL Final   • RDW 05/04/2021 12.4   12.3 - 15.4 % Final   • RDW-SD 05/04/2021 40.4  37.0 - 54.0 fl Final   • MPV 05/04/2021 10.6  6.0 - 12.0 fL Final   • Platelets 05/04/2021 193  140 - 450 10*3/mm3 Final   • Neutrophil % 05/04/2021 60.5  42.7 - 76.0 % Final   • Lymphocyte % 05/04/2021 28.3  19.6 - 45.3 % Final   • Monocyte % 05/04/2021 9.7  5.0 - 12.0 % Final   • Eosinophil % 05/04/2021 0.9  0.3 - 6.2 % Final   • Basophil % 05/04/2021 0.4  0.0 - 1.5 % Final   • Immature Grans % 05/04/2021 0.2  0.0 - 0.5 % Final   • Neutrophils, Absolute 05/04/2021 3.32  1.70 - 7.00 10*3/mm3 Final   • Lymphocytes, Absolute 05/04/2021 1.55  0.70 - 3.10 10*3/mm3 Final   • Monocytes, Absolute 05/04/2021 0.53  0.10 - 0.90 10*3/mm3 Final   • Eosinophils, Absolute 05/04/2021 0.05  0.00 - 0.40 10*3/mm3 Final   • Basophils, Absolute 05/04/2021 0.02  0.00 - 0.20 10*3/mm3 Final   • Immature Grans, Absolute 05/04/2021 0.01  0.00 - 0.05 10*3/mm3 Final   • nRBC 05/04/2021 0.0  0.0 - 0.2 /100 WBC Final   • BSA 05/04/2021 2.0  m^2 Final   •  CV ECHO ANASTACIO - BZI_BMI 05/04/2021 25.1  kilograms/m^2 Final   •  CV ECHO ANASTACIO - BSA([place]) 05/04/2021 2.0  m^2 Final   •  CV ECHO ANASTACIO - BZI_METRIC_WEIGHT 05/04/2021 79.4  kg Final   •  CV ECHO ANASTACIO - BZI_METRIC_HEIGHT 05/04/2021 177.8  cm Final   • Right Common Femoral Spont 05/04/2021 Y   Final   • Right Common Femoral Phasic 05/04/2021 Y   Final   • Right Common Femoral Augment 05/04/2021 Y   Final   • Right Common Femoral Compress 05/04/2021 C   Final   • Right Saphenofemoral Junction Spont 05/04/2021 Y   Final   • Right Saphenofemoral Junction Phas* 05/04/2021 Y   Final   • Right Saphenofemoral Junction Augm* 05/04/2021 Y   Final   • Right Saphenofemoral Junction Comp* 05/04/2021 C   Final   • Right Profunda Femoral Spont 05/04/2021 Y   Final   • Right Profunda Femoral Phasic 05/04/2021 Y   Final   • Right Profunda Femoral Augment 05/04/2021 Y   Final   • Right Proximal Femoral Spont 05/04/2021 Y   Final   • Right Proximal Femoral  Phasic 05/04/2021 Y   Final   • Right Proximal Femoral Augment 05/04/2021 Y   Final   • Right Proximal Femoral Compress 05/04/2021 C   Final   • Right Mid Femoral Spont 05/04/2021 Y   Final   • Right Mid Femoral Phasic 05/04/2021 Y   Final   • Right Mid Femoral Augment 05/04/2021 Y   Final   • Right Mid Femoral Compress 05/04/2021 C   Final   • Right Distal Femoral Spont 05/04/2021 Y   Final   • Right Distal Femoral Phasic 05/04/2021 Y   Final   • Right Distal Femoral Augment 05/04/2021 Y   Final   • Right Distal Femoral Compress 05/04/2021 C   Final   • Right Popliteal Spont 05/04/2021 Y   Final   • Right Popliteal Phasic 05/04/2021 Y   Final   • Right Popliteal Augment 05/04/2021 Y   Final   • Right Popliteal Compress 05/04/2021 C   Final   • Right Posterior Tibial Augment 05/04/2021 Y   Final   • Right Posterior Tibial Compress 05/04/2021 C   Final   • Right Peroneal Augment 05/04/2021 Y   Final   • Right Peroneal Compress 05/04/2021 C   Final   • Right GastronemiusSoleal Compress 05/04/2021 C   Final   • Right Greater Saph AK Compress 05/04/2021 C   Final   • Right Greater Saph BK Compress 05/04/2021 C   Final   • Right Lesser Saph Compress 05/04/2021 C   Final   • Left Common Femoral Spont 05/04/2021 Y   Final   • Left Common Femoral Phasic 05/04/2021 Y   Final   • Left Common Femoral Augment 05/04/2021 Y   Final   • Left Common Femoral Compress 05/04/2021 C   Final   • Left Saphenofemoral Junction Spont 05/04/2021 Y   Final   • Left Saphenofemoral Junction Phasic 05/04/2021 Y   Final   • Left Saphenofemoral Junction Augme* 05/04/2021 Y   Final   • Left Saphenofemoral Junction Compr* 05/04/2021 C   Final   • SARS-CoV-2 LUIS M 05/04/2021 Not Detected  Not Detected Final        No results found.    ASSESSMENT: The patient is a very pleasant 50 y.o. male  with right segmental pulmonary embolism    PROBLEM LIST:   1.  Right lower lobe pulmonary embolism:  A.  Presented with shortness of breath and chest pain  B.  CT  PE protocol done on May 5, 2020 was questionable for right segmental pulmonary embolism  C.  Currently on Xarelto once a day  2.  Positive Betta-2 Glyco 1 IgG on 2 separate occasions November 2020 and March 2021.    3.  Neuropathy    PLAN:  1.  I did go over the blood work results with the patient's letter that he has persistently positive beta-2 glycoprotein IgG antibody.  2.  The patient would benefit from lifelong anticoagulation given his antiphospholipid antibody  3.  The patient is willing to try Xarelto 15 mg daily.  I will give a new prescription today.  4.  Patient was advised to exercise and stay active.  5.  The patient was advised to stop treatment 2 days prior to surgical procedures and resume 1 day after.  6.  He will follow-up with me in 1 year.   Marco A Suggs MD  9/17/2021

## 2021-11-17 DIAGNOSIS — I26.99 PULMONARY EMBOLISM ON RIGHT (HCC): ICD-10-CM

## 2021-11-17 RX ORDER — RIVAROXABAN 20 MG/1
TABLET, FILM COATED ORAL
Qty: 90 TABLET | Refills: 1 | OUTPATIENT
Start: 2021-11-17

## 2022-05-09 ENCOUNTER — TELEPHONE (OUTPATIENT)
Dept: NEUROSURGERY | Facility: CLINIC | Age: 52
End: 2022-05-09

## 2022-05-09 NOTE — TELEPHONE ENCOUNTER
PATIENT IS SEEING HIS PCP, DR. REYNOSO, THIS WEEK, AND IS HAVING A REFERRAL GENERATED TO SEE DR. FALCON IN [place] FOR SCS REMOVAL CONSULTATION.     ORIGINAL PAIN MANAGEMENT/SCS TRIAL WAS COMPLETED BY DR. SANTANA AT [facility] IN [place], [place]. SURGICAL IMPLANTATION OF SCS WAS COMPLETED AT DR. FAN RUBIO'S OFFICE AT [facility] IN [place], [place] IN MAY 2020.    RECORDS WILL NEED TO BE REQUESTED ONCE REFERRAL IS RECEIVED.

## 2022-06-21 ENCOUNTER — PATIENT ROUNDING (BHMG ONLY) (OUTPATIENT)
Dept: NEUROSURGERY | Facility: CLINIC | Age: 52
End: 2022-06-21

## 2022-06-21 ENCOUNTER — OFFICE VISIT (OUTPATIENT)
Dept: NEUROSURGERY | Facility: CLINIC | Age: 52
End: 2022-06-21

## 2022-06-21 VITALS — HEIGHT: 70 IN | WEIGHT: 150 LBS | BODY MASS INDEX: 21.47 KG/M2 | TEMPERATURE: 97.1 F

## 2022-06-21 DIAGNOSIS — Z96.89 STATUS POST INSERTION OF SPINAL CORD STIMULATOR: Primary | ICD-10-CM

## 2022-06-21 PROCEDURE — 99203 OFFICE O/P NEW LOW 30 MIN: CPT | Performed by: NEUROLOGICAL SURGERY

## 2022-06-21 NOTE — PROGRESS NOTES
Patient: Hilario Stewart  : 1970    Primary Care Provider: Андрей Kang MD    Requesting Provider: As above        History    Chief Complaint: Right clavicle pain, left trapezius pain, and right upper extremity sensory alteration.    History of Present Illness: Mr. 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  He developed associated pain with that and ultimately in May 2020 underwent spinal cord stimulator placement in the cervical region.  This helped with the scalp pain.  However almost immediately after the surgery he began experiencing pain across his right clavicle.  If he lies down in certain positions he gets numbness that extends all the way down the right arm.  Because of this he has been sleeping with his arms at his sides.  He feels that his altered posture has helped to produce his left trapezius region pain.  He has done physical therapy for a year and this is not helpful.  He anticoagulated and has an antiphospholipid syndrome.  He has not charged his stimulator in 2 months and has not really noted a change in his symptoms.    Review of Systems   Constitutional: Negative for activity change, appetite change, chills, diaphoresis, fatigue, fever and unexpected weight change.   HENT: Negative for congestion, dental problem, drooling, ear discharge, ear pain, facial swelling, hearing loss, mouth sores, nosebleeds, postnasal drip, rhinorrhea, sinus pressure, sneezing, sore throat, tinnitus, trouble swallowing and voice change.    Eyes: Negative for photophobia, pain, discharge, redness, itching and visual disturbance.   Respiratory: Negative for apnea, cough, choking, chest tightness, shortness of breath, wheezing and stridor.    Cardiovascular: Negative for chest pain, palpitations and leg swelling.   Gastrointestinal: Negative for abdominal distention, abdominal pain, anal bleeding, blood in stool, constipation, diarrhea, nausea, rectal pain  "and vomiting.   Endocrine: Negative for cold intolerance, heat intolerance, polydipsia, polyphagia and polyuria.   Genitourinary: Negative for decreased urine volume, difficulty urinating, dysuria, enuresis, flank pain, frequency, genital sores, hematuria and urgency.   Musculoskeletal: Negative for arthralgias, back pain, gait problem, joint swelling, myalgias, neck pain and neck stiffness.   Skin: Negative for color change, pallor, rash and wound.   Allergic/Immunologic: Negative for environmental allergies, food allergies and immunocompromised state.   Neurological: Negative for dizziness, tremors, seizures, syncope, facial asymmetry, speech difficulty, weakness, light-headedness, numbness and headaches.   Hematological: Negative for adenopathy. Does not bruise/bleed easily.   Psychiatric/Behavioral: Negative for agitation, behavioral problems, confusion, decreased concentration, dysphoric mood, hallucinations, self-injury, sleep disturbance and suicidal ideas. The patient is not nervous/anxious and is not hyperactive.        The patient's past medical history, past surgical history, family history, and social history have been reviewed at length in the electronic medical record.      Physical Exam:   Temp 97.1 °F (36.2 °C) (Infrared)   Ht 177.8 cm (70\")   Wt 68 kg (150 lb)   BMI 21.52 kg/m²   MUSCULOSKELETAL:  Neck tenderness to palpation is not observed.   ROM in the neck is normal.  IPG is present on the high right chest wall below the clavicle.  Midline cervico-thoracic incision is noted.  NEUROLOGICAL:  Strength is intact in the upper and lower extremities to direct testing.  Muscle tone is normal throughout.  Station and gait are normal.  Sensation is intact to light touch testing throughout.    Medical Decision Making    Diagnosis:   Painful cervical spinal cord stimulator.    Treatment Options:   I am going to obtain plain films of the cervical spine and chest as well as a CT through the cervical " spine.  He will follow-up thereafter.  Barring anything unusual then we will make arrangements for spinal cord stimulator removal.  I briefly touched on the potential risk of this intervention.  I am not sure that removing this is going to address all of his current complaints.  He would need to come off of his anticoagulation in the perioperative period.       Diagnosis Plan   1. Status post insertion of spinal cord stimulator         Scribed for Bernardo Almanza MD by Izabela Boykin CMA on 6/21/2022 09:22 EDT       I, Dr. Almanza, personally performed the services described in the documentation, as scribed in my presence, and it is both accurate and complete.

## 2022-06-23 ENCOUNTER — HOSPITAL ENCOUNTER (OUTPATIENT)
Dept: GENERAL RADIOLOGY | Facility: HOSPITAL | Age: 52
Discharge: HOME | End: 2022-06-23
Admitting: STUDENT IN AN ORGANIZED HEALTH CARE EDUCATION/TRAINING PROGRAM

## 2022-06-23 ENCOUNTER — TRANSCRIBE ORDERS (OUTPATIENT)
Dept: ADMINISTRATIVE | Facility: HOSPITAL | Age: 52
End: 2022-06-23

## 2022-06-23 DIAGNOSIS — M25.521 RIGHT ELBOW PAIN: Primary | ICD-10-CM

## 2022-06-23 PROCEDURE — 73070 X-RAY EXAM OF ELBOW: CPT

## 2022-07-01 ENCOUNTER — HOSPITAL ENCOUNTER (OUTPATIENT)
Dept: CT IMAGING | Facility: HOSPITAL | Age: 52
Discharge: HOME | End: 2022-07-01

## 2022-07-01 ENCOUNTER — HOSPITAL ENCOUNTER (OUTPATIENT)
Dept: GENERAL RADIOLOGY | Facility: HOSPITAL | Age: 52
Discharge: HOME | End: 2022-07-01

## 2022-07-01 DIAGNOSIS — Z96.89 STATUS POST INSERTION OF SPINAL CORD STIMULATOR: ICD-10-CM

## 2022-07-01 PROCEDURE — 72125 CT NECK SPINE W/O DYE: CPT

## 2022-07-01 PROCEDURE — 71046 X-RAY EXAM CHEST 2 VIEWS: CPT

## 2022-07-01 PROCEDURE — 72040 X-RAY EXAM NECK SPINE 2-3 VW: CPT

## 2022-07-05 ENCOUNTER — PREP FOR SURGERY (OUTPATIENT)
Dept: OTHER | Facility: HOSPITAL | Age: 52
End: 2022-07-05

## 2022-07-05 ENCOUNTER — OFFICE VISIT (OUTPATIENT)
Dept: NEUROSURGERY | Facility: CLINIC | Age: 52
End: 2022-07-05

## 2022-07-05 VITALS — TEMPERATURE: 97.7 F | WEIGHT: 149.2 LBS | HEIGHT: 70 IN | BODY MASS INDEX: 21.36 KG/M2

## 2022-07-05 DIAGNOSIS — T85.192D MALFUNCTION OF SPINAL CORD STIMULATOR, SUBSEQUENT ENCOUNTER: Primary | ICD-10-CM

## 2022-07-05 DIAGNOSIS — T85.192A MALFUNCTION OF SPINAL CORD STIMULATOR: Primary | ICD-10-CM

## 2022-07-05 PROCEDURE — 99213 OFFICE O/P EST LOW 20 MIN: CPT | Performed by: NEUROLOGICAL SURGERY

## 2022-07-05 RX ORDER — CEFAZOLIN SODIUM 2 G/100ML
2 INJECTION, SOLUTION INTRAVENOUS ONCE
Status: CANCELLED | OUTPATIENT
Start: 2022-07-05 | End: 2022-07-05

## 2022-07-05 RX ORDER — CHLORHEXIDINE GLUCONATE 4 G/100ML
SOLUTION TOPICAL
Qty: 120 ML | Refills: 0 | Status: ON HOLD | OUTPATIENT
Start: 2022-07-05 | End: 2022-08-15

## 2022-07-05 RX ORDER — FAMOTIDINE 20 MG/1
20 TABLET, FILM COATED ORAL
Status: CANCELLED | OUTPATIENT
Start: 2022-07-05

## 2022-07-05 NOTE — H&P
Patient: Hilario Stewart  : 1970     Primary Care Provider: Андрей Kang MD     Requesting Provider: As above           History     Chief Complaint: Right clavicle pain, left trapezius pain, and right upper extremity sensory alteration.     History of Present Illness: Mr. 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  He developed associated pain with that and ultimately in May 2020 underwent spinal cord stimulator placement in the cervical region.  This helped with the scalp pain.  However almost immediately after the surgery he began experiencing pain across his right clavicle.  If he lies down in certain positions he gets numbness that extends all the way down the right arm.  Because of this he has been sleeping with his arms at his sides.  He feels that his altered posture has helped to produce his left trapezius region pain.  He has done physical therapy for a year and this is not helpful.  He anticoagulated and has an antiphospholipid syndrome.  He has not charged his stimulator in 2-3 months and has not really noted a change in his symptoms.     Review of Systems   Constitutional: Negative for activity change, appetite change, chills, diaphoresis, fatigue, fever and unexpected weight change.   HENT: Negative for congestion, dental problem, drooling, ear discharge, ear pain, facial swelling, hearing loss, mouth sores, nosebleeds, postnasal drip, rhinorrhea, sinus pressure, sneezing, sore throat, tinnitus, trouble swallowing and voice change.    Eyes: Negative for photophobia, pain, discharge, redness, itching and visual disturbance.   Respiratory: Negative for apnea, cough, choking, chest tightness, shortness of breath, wheezing and stridor.    Cardiovascular: Negative for chest pain, palpitations and leg swelling.   Gastrointestinal: Negative for abdominal distention, abdominal pain, anal bleeding, blood in stool, constipation, diarrhea, nausea,  rectal pain and vomiting.   Endocrine: Negative for cold intolerance, heat intolerance, polydipsia, polyphagia and polyuria.   Genitourinary: Negative for decreased urine volume, difficulty urinating, dysuria, enuresis, flank pain, frequency, genital sores, hematuria and urgency.   Musculoskeletal: Negative for arthralgias, back pain, gait problem, joint swelling, myalgias, neck pain and neck stiffness.   Skin: Negative for color change, pallor, rash and wound.   Allergic/Immunologic: Negative for environmental allergies, food allergies and immunocompromised state.   Neurological: Negative for dizziness, tremors, seizures, syncope, facial asymmetry, speech difficulty, weakness, light-headedness, numbness and headaches.   Hematological: Negative for adenopathy. Does not bruise/bleed easily.   Psychiatric/Behavioral: Negative for agitation, behavioral problems, confusion, decreased concentration, dysphoric mood, hallucinations, self-injury, sleep disturbance and suicidal ideas. The patient is not nervous/anxious and is not hyperactive.    All other systems reviewed and are negative.        The patient's past medical history, past surgical history, family history, and social history have been reviewed at length in the electronic medical record.     Past Medical History:   Diagnosis Date   • Bleeding disorder (HCC) May 2020    APLS diagnosis, currently on blood thinners   • Cancer (HCC) June 2012    Skin cancer on scalp, MOHS surgery   • Deep vein thrombosis (HCC) May 2020    Pulmonary Embolism detected   • Headache June 2012    MOHS surgery, right side temporal pain   • Pulmonary embolism (HCC)      Past Surgical History:   Procedure Laterality Date   • SPINAL CORD STIMULATOR IMPLANT  05/2020    DR. F[facility] IN [place], [place]   • TRIGGER POINT INJECTION  Various dates in 2020 and 2021    Attempt to resolve shoulder and neck pain, some of which due to SCS implantation     Family History  "  Problem Relation Age of Onset   • Prostate cancer Father    • Skin cancer Father    • Cancer Father         Skin and prostate cancer     Social History     Socioeconomic History   • Marital status:    Tobacco Use   • Smoking status: Never Smoker   • Smokeless tobacco: Never Used   Vaping Use   • Vaping Use: Never used   Substance and Sexual Activity   • Alcohol use: Yes     Alcohol/week: 6.0 standard drinks     Types: 6 Cans of beer per week   • Drug use: Never   • Sexual activity: Defer     Birth control/protection: None           Allergies   Allergen Reactions   • Tape Other (See Comments)   • Adhesive Tape Unknown - Low Severity   • Carbamazepine Unknown - Low Severity   • Latex Unknown - Low Severity       Current Outpatient Medications on File Prior to Visit   Medication Sig Dispense Refill   • cetirizine (zyrTEC) 10 MG tablet Take  by mouth Daily.     • pregabalin (LYRICA) 100 MG capsule Take 150 mg by mouth 2 (two) times a day.     • pregabalin (LYRICA) 150 MG capsule      • rivaroxaban (XARELTO) 15 MG tablet Take 1 tablet by mouth Daily. 30 tablet 5   • zolpidem (AMBIEN) 10 MG tablet Take 10 mg by mouth.     • [DISCONTINUED] predniSONE (DELTASONE) 10 MG tablet Take 20 mg by mouth Daily.     • [DISCONTINUED] vitamin D3 125 MCG (5000 UT) capsule capsule Take 5,000 Units by mouth Daily.       No current facility-administered medications on file prior to visit.         Physical Exam:   Temp 97.7 °F (36.5 °C)   Ht 177.8 cm (70\")   Wt 67.7 kg (149 lb 3.2 oz)   BMI 21.41 kg/m²   Low cervical incision is intact.  IPG is present on the right chest wall.     Medical Decision Making     Data Review:   (All imaging studies were personally reviewed unless stated otherwise)  CT demonstrates his stimulator lead entering the spine at the C5-6 level and extending to the C3-4 level.  On plain films there could be a \"crimp\" in the lead at the level of the clavicle.     Diagnosis:   Nonfunctioning cervical epidural " spinal cord stimulator.     Treatment Options:   We will make arrangements for complete removal of his spinal cord stimulator system.  The nature of the procedure as well as the potential risks, complications, limitations, and alternatives to the procedure were discussed at length with the patient and the patient has agreed to proceed with surgery.  These risks are said to include but not be limited to infection, weakness, numbness, paralysis, failure to improve.  He will need to hold his anticoagulation in the perioperative period.          Diagnosis Plan   1. Malfunction of spinal cord stimulator, subsequent encounter

## 2022-07-05 NOTE — PROGRESS NOTES
Patient: Hilario Stewart  : 1970    Primary Care Provider: Андрей Kang MD    Requesting Provider: As above        History    Chief Complaint: Right clavicle pain, left trapezius pain, and right upper extremity sensory alteration.    History of Present Illness: Mr. 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  He developed associated pain with that and ultimately in May 2020 underwent spinal cord stimulator placement in the cervical region.  This helped with the scalp pain.  However almost immediately after the surgery he began experiencing pain across his right clavicle.  If he lies down in certain positions he gets numbness that extends all the way down the right arm.  Because of this he has been sleeping with his arms at his sides.  He feels that his altered posture has helped to produce his left trapezius region pain.  He has done physical therapy for a year and this is not helpful.  He anticoagulated and has an antiphospholipid syndrome.  He has not charged his stimulator in 2-3 months and has not really noted a change in his symptoms.    Review of Systems   Constitutional: Negative for activity change, appetite change, chills, diaphoresis, fatigue, fever and unexpected weight change.   HENT: Negative for congestion, dental problem, drooling, ear discharge, ear pain, facial swelling, hearing loss, mouth sores, nosebleeds, postnasal drip, rhinorrhea, sinus pressure, sneezing, sore throat, tinnitus, trouble swallowing and voice change.    Eyes: Negative for photophobia, pain, discharge, redness, itching and visual disturbance.   Respiratory: Negative for apnea, cough, choking, chest tightness, shortness of breath, wheezing and stridor.    Cardiovascular: Negative for chest pain, palpitations and leg swelling.   Gastrointestinal: Negative for abdominal distention, abdominal pain, anal bleeding, blood in stool, constipation, diarrhea, nausea, rectal  "pain and vomiting.   Endocrine: Negative for cold intolerance, heat intolerance, polydipsia, polyphagia and polyuria.   Genitourinary: Negative for decreased urine volume, difficulty urinating, dysuria, enuresis, flank pain, frequency, genital sores, hematuria and urgency.   Musculoskeletal: Negative for arthralgias, back pain, gait problem, joint swelling, myalgias, neck pain and neck stiffness.   Skin: Negative for color change, pallor, rash and wound.   Allergic/Immunologic: Negative for environmental allergies, food allergies and immunocompromised state.   Neurological: Negative for dizziness, tremors, seizures, syncope, facial asymmetry, speech difficulty, weakness, light-headedness, numbness and headaches.   Hematological: Negative for adenopathy. Does not bruise/bleed easily.   Psychiatric/Behavioral: Negative for agitation, behavioral problems, confusion, decreased concentration, dysphoric mood, hallucinations, self-injury, sleep disturbance and suicidal ideas. The patient is not nervous/anxious and is not hyperactive.    All other systems reviewed and are negative.      The patient's past medical history, past surgical history, family history, and social history have been reviewed at length in the electronic medical record.      Physical Exam:   Temp 97.7 °F (36.5 °C)   Ht 177.8 cm (70\")   Wt 67.7 kg (149 lb 3.2 oz)   BMI 21.41 kg/m²   Low cervical incision is intact.  IPG is present on the right chest wall.    Medical Decision Making    Data Review:   (All imaging studies were personally reviewed unless stated otherwise)  CT demonstrates his stimulator lead entering the spine at the C5-6 level and extending to the C3-4 level.  On plain films there could be a \"crimp\" in the lead at the level of the clavicle.    Diagnosis:   Nonfunctioning cervical epidural spinal cord stimulator.    Treatment Options:   We will make arrangements for complete removal of his spinal cord stimulator system.  The nature of " the procedure as well as the potential risks, complications, limitations, and alternatives to the procedure were discussed at length with the patient and the patient has agreed to proceed with surgery.  These risks are said to include but not be limited to infection, weakness, numbness, paralysis, failure to improve.  Patient will need to hold his anticoagulation in the perioperative period.       Diagnosis Plan   1. Malfunction of spinal cord stimulator, subsequent encounter         Scribed for Bernardo Almanza MD by Izabela Boykin CMA on 7/5/2022 14:08 EDT       I, Dr. Almanza, personally performed the services described in the documentation, as scribed in my presence, and it is both accurate and complete.

## 2022-07-07 ENCOUNTER — TELEPHONE (OUTPATIENT)
Dept: ONCOLOGY | Facility: CLINIC | Age: 52
End: 2022-07-07

## 2022-07-07 NOTE — TELEPHONE ENCOUNTER
Duc with Dr. Almanza's office left a voicemail that patient is having a procedure removal of cervical stimulator and Es Peña has prescribed Xarleto for this patient so this will have to be held 48 hours before this procedure. He said is something could be put in epic? If you need to call him back [phone].

## 2022-08-11 ENCOUNTER — TELEPHONE (OUTPATIENT)
Dept: NEUROSURGERY | Facility: CLINIC | Age: 52
End: 2022-08-11

## 2022-08-11 NOTE — TELEPHONE ENCOUNTER
Provider:  Archie  Surgery/Procedure:  SCS Removal  Surgery/Procedure Date:  08/15/2022  Last visit:   07/05/2022  Next visit:        Reason for call: Patient called LVM on clinical line stating the shower solution has a fragrance in it, and he is highly allergic to fragrances. Patient wanted to know what he could use instead. I advised the patient he could use an antibacterial soap like dial, tonight, and then PAT would be able to give him other options tomorrow. Patient verbalized understanding, and had no other questions.

## 2022-08-12 ENCOUNTER — PRE-ADMISSION TESTING (OUTPATIENT)
Dept: PREADMISSION TESTING | Facility: HOSPITAL | Age: 52
End: 2022-08-12

## 2022-08-12 VITALS — HEIGHT: 70 IN | WEIGHT: 154.43 LBS | BODY MASS INDEX: 22.11 KG/M2

## 2022-08-12 DIAGNOSIS — T85.192A MALFUNCTION OF SPINAL CORD STIMULATOR: ICD-10-CM

## 2022-08-12 LAB
DEPRECATED RDW RBC AUTO: 42.3 FL (ref 37–54)
ERYTHROCYTE [DISTWIDTH] IN BLOOD BY AUTOMATED COUNT: 12.5 % (ref 12.3–15.4)
HBA1C MFR BLD: 5.1 % (ref 4.8–5.6)
HCT VFR BLD AUTO: 48.8 % (ref 37.5–51)
HGB BLD-MCNC: 16.3 G/DL (ref 13–17.7)
INR PPP: 0.94 (ref 0.84–1.13)
MCH RBC QN AUTO: 30.8 PG (ref 26.6–33)
MCHC RBC AUTO-ENTMCNC: 33.4 G/DL (ref 31.5–35.7)
MCV RBC AUTO: 92.1 FL (ref 79–97)
MRSA DNA SPEC QL NAA+PROBE: NEGATIVE
PLATELET # BLD AUTO: 178 10*3/MM3 (ref 140–450)
PMV BLD AUTO: 10.4 FL (ref 6–12)
PROTHROMBIN TIME: 12.4 SECONDS (ref 11.4–14.4)
QT INTERVAL: 384 MS
QTC INTERVAL: 396 MS
RBC # BLD AUTO: 5.3 10*6/MM3 (ref 4.14–5.8)
SARS-COV-2 RNA PNL SPEC NAA+PROBE: NOT DETECTED
WBC NRBC COR # BLD: 4.93 10*3/MM3 (ref 3.4–10.8)

## 2022-08-12 PROCEDURE — 85027 COMPLETE CBC AUTOMATED: CPT

## 2022-08-12 PROCEDURE — U0004 COV-19 TEST NON-CDC HGH THRU: HCPCS

## 2022-08-12 PROCEDURE — 87641 MR-STAPH DNA AMP PROBE: CPT

## 2022-08-12 PROCEDURE — 93010 ELECTROCARDIOGRAM REPORT: CPT | Performed by: INTERNAL MEDICINE

## 2022-08-12 PROCEDURE — 36415 COLL VENOUS BLD VENIPUNCTURE: CPT

## 2022-08-12 PROCEDURE — 83036 HEMOGLOBIN GLYCOSYLATED A1C: CPT

## 2022-08-12 PROCEDURE — 85610 PROTHROMBIN TIME: CPT

## 2022-08-12 PROCEDURE — C9803 HOPD COVID-19 SPEC COLLECT: HCPCS

## 2022-08-12 PROCEDURE — 93005 ELECTROCARDIOGRAM TRACING: CPT

## 2022-08-12 RX ORDER — DOXYCYCLINE HYCLATE 100 MG/1
100 CAPSULE ORAL 2 TIMES DAILY
Status: ON HOLD | COMMUNITY
Start: 2022-07-15 | End: 2022-08-15

## 2022-08-12 RX ORDER — TADALAFIL 10 MG/1
TABLET ORAL
COMMUNITY
Start: 2022-08-01 | End: 2022-08-26

## 2022-08-12 RX ORDER — SILDENAFIL CITRATE 20 MG/1
TABLET ORAL
COMMUNITY
Start: 2022-06-27 | End: 2022-08-26

## 2022-08-12 NOTE — PAT
Patient viewed general PAT education video as instructed in their preoperative information received from their surgeon.  Patient stated the general PAT education video was viewed in its entirety and survey completed.  Copies of PAT general education handouts (Incentive Spirometry, Meds to Beds Program, Patient Belongings, Pre-op skin preparation instructions, Blood Glucose testing, Visitor policy, Surgery FAQ, Code H) distributed to patient if not printed. Education related to the PAT pass and skin preparation for surgery (if applicable) completed in PAT as a reinforcement to PAT education video. Patient instructed to return PAT pass provided today as well as completed skin preparation sheet (if applicable) on the day of procedure.     Additionally if patient had not viewed video yet but intended to view it at home or in our waiting area, then referred them to the handout with QR code/link provided during PAT visit.  Instructed patient to complete survey after viewing the video in its entirety.  Encouraged patient/family to read [facility] general education handouts thoroughly and notify PAT staff with any questions or concerns. Patient verbalized understanding of all information and priority content.    An arrival time for procedure was not given during PAT visit. If patient had any questions or concerns about their arrival time, they were instructed to contact their surgeon/physician.  Additionally, if the patient referred to an arrival time that was acquired from their my chart account, patient was encouraged to verify that time with their surgeon/physician.  NO arrival times given in Pre Admission Testing Department.    Patient to apply Chlorhexadine wipes (FRAGRANCE FREE)  to surgical area (as instructed) the night before procedure and the AM of procedure. Wipes provided.    Bactroban and DIAL ANTIBACTERIAL SOAP prescribed by physician before PAT visit.  Verified with patient that medications were picked up from their  pharmacy.  Written instructions given to patient during PAT visit.  Patient/family also instructed to complete skin prep checklist and return the checklist on the day of surgery to preoperative staff.  Patient/family verbalized understanding.    PATIENT INSTRUCTED BY SURGEON'S OFFICE TO USE FRAGRANCE FREE DIAL ANTIBACTERIAL SOAP INSTEAD OF CHG SHOWER SOAP.    COVID test done in PAT.

## 2022-08-15 ENCOUNTER — APPOINTMENT (OUTPATIENT)
Dept: GENERAL RADIOLOGY | Facility: HOSPITAL | Age: 52
End: 2022-08-15

## 2022-08-15 ENCOUNTER — HOSPITAL ENCOUNTER (OUTPATIENT)
Facility: HOSPITAL | Age: 52
Discharge: HOME | End: 2022-08-15
Attending: NEUROLOGICAL SURGERY | Admitting: NEUROLOGICAL SURGERY

## 2022-08-15 ENCOUNTER — ANESTHESIA EVENT (OUTPATIENT)
Dept: PERIOP | Facility: HOSPITAL | Age: 52
End: 2022-08-15

## 2022-08-15 ENCOUNTER — ANESTHESIA (OUTPATIENT)
Dept: PERIOP | Facility: HOSPITAL | Age: 52
End: 2022-08-15

## 2022-08-15 VITALS
DIASTOLIC BLOOD PRESSURE: 74 MMHG | OXYGEN SATURATION: 97 % | SYSTOLIC BLOOD PRESSURE: 121 MMHG | RESPIRATION RATE: 20 BRPM | HEART RATE: 79 BPM | TEMPERATURE: 98.3 F

## 2022-08-15 DIAGNOSIS — T85.192A MALFUNCTION OF SPINAL CORD STIMULATOR: ICD-10-CM

## 2022-08-15 DIAGNOSIS — I26.99 PULMONARY EMBOLISM ON RIGHT: ICD-10-CM

## 2022-08-15 PROCEDURE — 63664 REVISE SPINE ELTRD PLATE: CPT | Performed by: PHYSICIAN ASSISTANT

## 2022-08-15 PROCEDURE — 25010000002 FENTANYL CITRATE (PF) 50 MCG/ML SOLUTION: Performed by: NURSE ANESTHETIST, CERTIFIED REGISTERED

## 2022-08-15 PROCEDURE — 63664 REVISE SPINE ELTRD PLATE: CPT | Performed by: NEUROLOGICAL SURGERY

## 2022-08-15 PROCEDURE — 25010000002 DEXAMETHASONE PER 1 MG: Performed by: NURSE ANESTHETIST, CERTIFIED REGISTERED

## 2022-08-15 PROCEDURE — 25010000002 FENTANYL CITRATE (PF) 50 MCG/ML SOLUTION

## 2022-08-15 PROCEDURE — 25010000002 PROPOFOL 10 MG/ML EMULSION: Performed by: NURSE ANESTHETIST, CERTIFIED REGISTERED

## 2022-08-15 PROCEDURE — 63688 REV/RMV IMP SP NPG/R DTCH CN: CPT | Performed by: NEUROLOGICAL SURGERY

## 2022-08-15 PROCEDURE — 25010000002 CEFAZOLIN IN DEXTROSE 2-4 GM/100ML-% SOLUTION: Performed by: NEUROLOGICAL SURGERY

## 2022-08-15 PROCEDURE — 76000 FLUOROSCOPY <1 HR PHYS/QHP: CPT

## 2022-08-15 PROCEDURE — 25010000002 ONDANSETRON PER 1 MG: Performed by: NURSE ANESTHETIST, CERTIFIED REGISTERED

## 2022-08-15 DEVICE — HEMOST ABS SURGIFOAM SZ100 8X12 10MM: Type: IMPLANTABLE DEVICE | Site: SPINE CERVICAL | Status: FUNCTIONAL

## 2022-08-15 DEVICE — FLOSEAL HEMOSTATIC MATRIX, 10ML
Type: IMPLANTABLE DEVICE | Site: SPINE CERVICAL | Status: FUNCTIONAL
Brand: FLOSEAL HEMOSTATIC MATRIX

## 2022-08-15 RX ORDER — SODIUM CHLORIDE 0.9 % (FLUSH) 0.9 %
10 SYRINGE (ML) INJECTION EVERY 12 HOURS SCHEDULED
Status: DISCONTINUED | OUTPATIENT
Start: 2022-08-15 | End: 2022-08-15 | Stop reason: HOSPADM

## 2022-08-15 RX ORDER — OXYCODONE AND ACETAMINOPHEN 7.5; 325 MG/1; MG/1
1 TABLET ORAL ONCE AS NEEDED
Status: COMPLETED | OUTPATIENT
Start: 2022-08-15 | End: 2022-08-15

## 2022-08-15 RX ORDER — FENTANYL CITRATE 50 UG/ML
INJECTION, SOLUTION INTRAMUSCULAR; INTRAVENOUS
Status: COMPLETED
Start: 2022-08-15 | End: 2022-08-15

## 2022-08-15 RX ORDER — HYDROMORPHONE HYDROCHLORIDE 1 MG/ML
0.5 INJECTION, SOLUTION INTRAMUSCULAR; INTRAVENOUS; SUBCUTANEOUS
Status: DISCONTINUED | OUTPATIENT
Start: 2022-08-15 | End: 2022-08-15 | Stop reason: HOSPADM

## 2022-08-15 RX ORDER — MIDAZOLAM HYDROCHLORIDE 1 MG/ML
1 INJECTION INTRAMUSCULAR; INTRAVENOUS
Status: DISCONTINUED | OUTPATIENT
Start: 2022-08-15 | End: 2022-08-15 | Stop reason: HOSPADM

## 2022-08-15 RX ORDER — PROPOFOL 10 MG/ML
VIAL (ML) INTRAVENOUS AS NEEDED
Status: DISCONTINUED | OUTPATIENT
Start: 2022-08-15 | End: 2022-08-15 | Stop reason: SURG

## 2022-08-15 RX ORDER — DEXAMETHASONE SODIUM PHOSPHATE 4 MG/ML
INJECTION, SOLUTION INTRA-ARTICULAR; INTRALESIONAL; INTRAMUSCULAR; INTRAVENOUS; SOFT TISSUE AS NEEDED
Status: DISCONTINUED | OUTPATIENT
Start: 2022-08-15 | End: 2022-08-15 | Stop reason: SURG

## 2022-08-15 RX ORDER — MAGNESIUM HYDROXIDE 1200 MG/15ML
LIQUID ORAL AS NEEDED
Status: DISCONTINUED | OUTPATIENT
Start: 2022-08-15 | End: 2022-08-15 | Stop reason: HOSPADM

## 2022-08-15 RX ORDER — BUPIVACAINE HCL/0.9 % NACL/PF 0.125 %
PLASTIC BAG, INJECTION (ML) EPIDURAL AS NEEDED
Status: DISCONTINUED | OUTPATIENT
Start: 2022-08-15 | End: 2022-08-15 | Stop reason: SURG

## 2022-08-15 RX ORDER — FAMOTIDINE 10 MG/ML
20 INJECTION, SOLUTION INTRAVENOUS ONCE
Status: CANCELLED | OUTPATIENT
Start: 2022-08-15 | End: 2022-08-15

## 2022-08-15 RX ORDER — OXYCODONE AND ACETAMINOPHEN 7.5; 325 MG/1; MG/1
TABLET ORAL
Status: COMPLETED
Start: 2022-08-15 | End: 2022-08-15

## 2022-08-15 RX ORDER — SODIUM CHLORIDE, SODIUM LACTATE, POTASSIUM CHLORIDE, CALCIUM CHLORIDE 600; 310; 30; 20 MG/100ML; MG/100ML; MG/100ML; MG/100ML
9 INJECTION, SOLUTION INTRAVENOUS CONTINUOUS
Status: DISCONTINUED | OUTPATIENT
Start: 2022-08-15 | End: 2022-08-15 | Stop reason: HOSPADM

## 2022-08-15 RX ORDER — ONDANSETRON 2 MG/ML
INJECTION INTRAMUSCULAR; INTRAVENOUS AS NEEDED
Status: DISCONTINUED | OUTPATIENT
Start: 2022-08-15 | End: 2022-08-15 | Stop reason: SURG

## 2022-08-15 RX ORDER — SODIUM CHLORIDE 0.9 % (FLUSH) 0.9 %
10 SYRINGE (ML) INJECTION AS NEEDED
Status: DISCONTINUED | OUTPATIENT
Start: 2022-08-15 | End: 2022-08-15 | Stop reason: HOSPADM

## 2022-08-15 RX ORDER — LIDOCAINE HYDROCHLORIDE 10 MG/ML
0.5 INJECTION, SOLUTION EPIDURAL; INFILTRATION; INTRACAUDAL; PERINEURAL ONCE AS NEEDED
Status: COMPLETED | OUTPATIENT
Start: 2022-08-15 | End: 2022-08-15

## 2022-08-15 RX ORDER — CEFAZOLIN SODIUM 2 G/100ML
2 INJECTION, SOLUTION INTRAVENOUS ONCE
Status: COMPLETED | OUTPATIENT
Start: 2022-08-15 | End: 2022-08-15

## 2022-08-15 RX ORDER — FENTANYL CITRATE 50 UG/ML
50 INJECTION, SOLUTION INTRAMUSCULAR; INTRAVENOUS
Status: DISCONTINUED | OUTPATIENT
Start: 2022-08-15 | End: 2022-08-15 | Stop reason: HOSPADM

## 2022-08-15 RX ORDER — EPHEDRINE SULFATE 50 MG/ML
INJECTION, SOLUTION INTRAVENOUS AS NEEDED
Status: DISCONTINUED | OUTPATIENT
Start: 2022-08-15 | End: 2022-08-15 | Stop reason: SURG

## 2022-08-15 RX ORDER — LIDOCAINE HYDROCHLORIDE 10 MG/ML
INJECTION, SOLUTION EPIDURAL; INFILTRATION; INTRACAUDAL; PERINEURAL AS NEEDED
Status: DISCONTINUED | OUTPATIENT
Start: 2022-08-15 | End: 2022-08-15 | Stop reason: SURG

## 2022-08-15 RX ORDER — OXYCODONE AND ACETAMINOPHEN 7.5; 325 MG/1; MG/1
1 TABLET ORAL EVERY 6 HOURS PRN
Qty: 20 TABLET | Refills: 0 | Status: SHIPPED | OUTPATIENT
Start: 2022-08-15 | End: 2022-08-26

## 2022-08-15 RX ORDER — ONDANSETRON 2 MG/ML
4 INJECTION INTRAMUSCULAR; INTRAVENOUS ONCE AS NEEDED
Status: DISCONTINUED | OUTPATIENT
Start: 2022-08-15 | End: 2022-08-15 | Stop reason: HOSPADM

## 2022-08-15 RX ORDER — FAMOTIDINE 20 MG/1
20 TABLET, FILM COATED ORAL ONCE
Status: COMPLETED | OUTPATIENT
Start: 2022-08-15 | End: 2022-08-15

## 2022-08-15 RX ORDER — FENTANYL CITRATE 50 UG/ML
INJECTION, SOLUTION INTRAMUSCULAR; INTRAVENOUS AS NEEDED
Status: DISCONTINUED | OUTPATIENT
Start: 2022-08-15 | End: 2022-08-15 | Stop reason: SURG

## 2022-08-15 RX ORDER — ROCURONIUM BROMIDE 10 MG/ML
INJECTION, SOLUTION INTRAVENOUS AS NEEDED
Status: DISCONTINUED | OUTPATIENT
Start: 2022-08-15 | End: 2022-08-15 | Stop reason: SURG

## 2022-08-15 RX ADMIN — LIDOCAINE HYDROCHLORIDE 50 MG: 10 INJECTION, SOLUTION EPIDURAL; INFILTRATION; INTRACAUDAL; PERINEURAL at 16:40

## 2022-08-15 RX ADMIN — EPHEDRINE SULFATE 10 MG: 50 INJECTION, SOLUTION INTRAVENOUS at 16:55

## 2022-08-15 RX ADMIN — SODIUM CHLORIDE, POTASSIUM CHLORIDE, SODIUM LACTATE AND CALCIUM CHLORIDE 9 ML/HR: 600; 310; 30; 20 INJECTION, SOLUTION INTRAVENOUS at 13:08

## 2022-08-15 RX ADMIN — ROCURONIUM BROMIDE 40 MG: 10 INJECTION INTRAVENOUS at 16:40

## 2022-08-15 RX ADMIN — FENTANYL CITRATE 100 MCG: 50 INJECTION, SOLUTION INTRAMUSCULAR; INTRAVENOUS at 16:40

## 2022-08-15 RX ADMIN — FAMOTIDINE 20 MG: 20 TABLET ORAL at 13:20

## 2022-08-15 RX ADMIN — SUGAMMADEX 200 MG: 100 INJECTION, SOLUTION INTRAVENOUS at 17:32

## 2022-08-15 RX ADMIN — EPHEDRINE SULFATE 10 MG: 50 INJECTION, SOLUTION INTRAVENOUS at 17:12

## 2022-08-15 RX ADMIN — CEFAZOLIN SODIUM 2 G: 2 INJECTION, SOLUTION INTRAVENOUS at 16:44

## 2022-08-15 RX ADMIN — ROCURONIUM BROMIDE 20 MG: 10 INJECTION INTRAVENOUS at 17:01

## 2022-08-15 RX ADMIN — FENTANYL CITRATE 50 MCG: 0.05 INJECTION, SOLUTION INTRAMUSCULAR; INTRAVENOUS at 18:01

## 2022-08-15 RX ADMIN — OXYCODONE AND ACETAMINOPHEN 1 TABLET: 7.5; 325 TABLET ORAL at 18:43

## 2022-08-15 RX ADMIN — Medication 100 MCG: at 16:47

## 2022-08-15 RX ADMIN — OXYCODONE HYDROCHLORIDE AND ACETAMINOPHEN 1 TABLET: 7.5; 325 TABLET ORAL at 18:43

## 2022-08-15 RX ADMIN — ONDANSETRON 4 MG: 2 INJECTION INTRAMUSCULAR; INTRAVENOUS at 17:19

## 2022-08-15 RX ADMIN — FENTANYL CITRATE 50 MCG: 50 INJECTION, SOLUTION INTRAMUSCULAR; INTRAVENOUS at 18:01

## 2022-08-15 RX ADMIN — DEXAMETHASONE SODIUM PHOSPHATE 8 MG: 4 INJECTION, SOLUTION INTRAMUSCULAR; INTRAVENOUS at 16:44

## 2022-08-15 RX ADMIN — LIDOCAINE HYDROCHLORIDE 0.2 ML: 10 INJECTION, SOLUTION EPIDURAL; INFILTRATION; INTRACAUDAL; PERINEURAL at 13:08

## 2022-08-15 RX ADMIN — Medication 200 MCG: at 16:52

## 2022-08-15 RX ADMIN — PROPOFOL 150 MG: 10 INJECTION, EMULSION INTRAVENOUS at 16:40

## 2022-08-15 NOTE — ANESTHESIA POSTPROCEDURE EVALUATION
Patient: Hilario Stewart    Procedure Summary     Date: 08/15/22 Room / Location:  [facility]    Anesthesia Start: 1636 Anesthesia Stop:     Procedure: Complete removal of cervical epidural spinal cord stimulator (N/A Back) Diagnosis:       Malfunction of spinal cord stimulator (HCC)      (Malfunction of spinal cord stimulator (HCC) [T85.192A])    Surgeons: Bernardo Almanza MD Provider: Aleksander Walters MD    Anesthesia Type: general ASA Status: 3          Anesthesia Type: general    Vitals  No vitals data found for the desired time range.          Post Anesthesia Care and Evaluation    Patient location during evaluation: PACU  Patient participation: complete - patient participated  Level of consciousness: awake and alert  Pain management: adequate    Airway patency: patent  Anesthetic complications: No anesthetic complications  PONV Status: none  Cardiovascular status: hemodynamically stable and acceptable  Respiratory status: nonlabored ventilation, acceptable and nasal cannula  Hydration status: acceptable

## 2022-08-15 NOTE — OP NOTE
Chief complaint:   Chief Complaint   Patient presents with   • Hospital F/U     from [facility] due to breathing problemdischarged        Vitals:  Visit Vitals   • /60   • Pulse 81   • Temp 97.1 °F (36.2 °C) (Tympanic)   • Ht 5' 5\" (1.651 m)   • Wt 74.9 kg   • SpO2 97%   • BMI 27.46 kg/m2     Wt Readings from Last 10 Encounters:   04/26/17 74.9 kg   10/24/16 72.6 kg   06/22/16 72.6 kg   01/27/16 72.5 kg   10/06/15 76.3 kg   08/18/15 71.6 kg   07/23/15 71.7 kg   06/03/15 71.7 kg   04/21/15 71.9 kg   03/11/15 71.2 kg       HISTORY OF PRESENT ILLNESS     HPI Comments: Here today for hospital follow-up from [facility]:      Son at visit, states patient was having breathing difficulty at home.  911 called.  Taken to [facility].  Per documents reviewed:  CXR - no acute disease  Blood cultures x 2, negative at 5 days.  Troponin negative.  BNP 83.  CMP normal  CBC was ok except WBC elevated at 11.1.  EKG showed normal sinus rhythm with first degree AV block.  Respiratory Virus Panel - negative.  Received azithromycin during hospitalization and two days after discharge.    Discharged home approx 4/17/2017.  Caregiver has been staying with patient.    Son states he still has cough, but doing a little better.  Eating and drinking ok.  Urinating ok.    Patient has not complained about moving his bowels.    Walks at home with walker.  States was told by hospital that he should have oxygen at home.      Patient states he is doing ok.  Has some abdominal pain. But not worse than usual.  Has some shortness of breath but states he is able to walk around his home, with his walker.                  Other significant problems:  Patient Active Problem List    Diagnosis Date Noted   • VAD (vascular dementia) 08/06/2014     Priority: Low   • Parkinson disease 03/24/2014     Priority: Low   • Coronary atherosclerosis of unspecified type of vessel, native or graft 09/11/2012     Priority: Low   • Other abnormal blood  NEUROSURGICAL OPERATIVE NOTE        PREOPERATIVE DIAGNOSIS:    Malfunctioning and painful cervical epidural spinal cord stimulator      POSTOPERATIVE DIAGNOSIS:  Same      PROCEDURE:  Removal of entire cervical epidural spinal cord stimulator      SURGEON:  Bernardo Almanza M.D.      ASSISTANT: Jodee Hernandez PA-C    PAC assisted with:   Suctioning   Retraction   Tying   Suturing   Closing   Application of dressing   Skilled neurosurgery PA assistance was necessary to perform this procedure.        ANESTHESIA:  General      ESTIMATED BLOOD LOSS: Minimal      SPECIMEN: None      DRAINS: None      COMPLICATIONS:  None      CLINICAL NOTE:  The patient is a 51-year-old gentleman who a couple years ago underwent cervical epidural spinal cord stimulator placement to try and help with scalp pain after a skin cancer surgery.  He has had severe clavicular pain with sensory alteration involving his arm since that time.  He is no longer using the spinal cord stimulator and given the associated pain he presents at this time for removal of the entire system.  The nature of the procedure as well as the potential risks, complications, limitations, and alternatives to the procedure were discussed at length with the patient and the patient has agreed to proceed with surgery.      TECHNICAL NOTE:  The patient was brought to the operating room and general endotracheal anesthesia was achieved. He was then turned onto his left side.  Special care was ensured to protect pressure points.  An axillary roll was utilized.  His dorsal neck and upper right chest wall were prepared and draped in the usual fashion. The incision on the mid chest wall was reopened and the IPG from the spinal cord stimulator was withdrawn from the pocket.  Lead wires were severed.  I then turned attention to the dorsal cervical incision, which was opened in the deep subcutaneous tissues. I identified the lead wires and pulled those through.  The lead wires were  chemistry 09/11/2012     Priority: Low   • Occlusion and stenosis of carotid artery without mention of cerebral infarction 09/11/2012     Priority: Low   • Personal history of urinary calculi 09/11/2012     Priority: Low   • Postsurgical aortocoronary bypass status 09/11/2012     Priority: Low   • Chronic kidney disease, stage II (mild)      Priority: Low   • Unspecified vitamin D deficiency      Priority: Low   • Hyperlipidemia      Priority: Low   • Osteoporosis, unspecified      Priority: Low   • Hypertension      Priority: Low       PAST MEDICAL, FAMILY AND SOCIAL HISTORY     Medications:  Current Outpatient Prescriptions   Medication   • fluticasone (FLOVENT HFA) 110 MCG/ACT inhaler   • albuterol-ipratropium 2.5 mg/0.5 mg (DUONEB) 0.5-2.5 (3) MG/3ML nebulizer solution   • Vitamin D, Ergocalciferol, 66577 units capsule   • Vitamin D, Ergocalciferol, 98290 UNITS capsule   • tamsulosin (FLOMAX) 0.4 MG Cap   • simvastatin (ZOCOR) 40 MG tablet   • metoPROLOL (TOPROL-XL) 25 MG 24 hr tablet   • carbidopa-levodopa (SINEMET)  MG per tablet   • Cranberry 250 MG Cap   • guaiFENesin-codeine (CHERATUSSIN AC) 100-10 MG/5ML liquid   • docusate sodium-sennosides (DOC-Q-LAX) 50-8.6 MG per tablet   • ferrous sulfate 325 (65 FE) MG tablet     No current facility-administered medications for this visit.        Allergies:  ALLERGIES:  No Known Allergies    Past Medical  History/Surgeries:  Past Medical History:   Diagnosis Date   • Acute exacerbation of chronic obstructive pulmonary disease (COPD) 04/2017    Hospitalized at [facility]   • CAD (coronary artery disease) 2004    CABG in 2004   • Dyslipidemia    • Hypertension    • Osteoporosis, unspecified 01/26/2011   • Urinary retention 01/2017    Hospitalized at [facility]       Past Surgical History:   Procedure Laterality Date   • CORONARY ARTERY BYPASS GRAFT  2004    [facility]   • DEXA BONE DENSITY AXIAL SKELETON  12/10/2010       Family History:  No family history on  followed down to the lead itself, which was withdrawn from the epidural space.  Bleeding points were controlled with bipolar cautery.  The muscle and fascial layers were closed in interrupted fashion with 2-0 Vicryl suture.  Subcutaneous tissues were closed in layers with 3-0 Vicryl suture.   A 3-0 Vicryl suture was utilized to close the subcutaneous tissues on the chest wall.  The skin at each incision site was closed in a running subcuticular fashion with 3-0 Vicryl suture.  A skin sealant was utilized over the incisions.  This was followed by Tegaderm dressings.  The patient was subsequently taken to the recovery room in satisfactory condition. There were no overt intraoperative complications.  Once again, the entire spinal cord stimulator system was removed.              Bernardo Almanza M.D.     file.    Social History:  Social History   Substance Use Topics   • Smoking status: Former Smoker   • Smokeless tobacco: Never Used   • Alcohol use No       REVIEW OF SYSTEMS     Review of Systems   Constitutional: Negative for appetite change and fever.   Respiratory: Positive for cough and shortness of breath.    Gastrointestinal: Negative for abdominal pain, constipation and diarrhea.   Genitourinary: Negative for difficulty urinating.   Neurological: Positive for weakness (generalized.  Caregiver states he walks around the home using his walker.).       PHYSICAL EXAM     Physical Exam   Constitutional: He is oriented to person, place, and time. He appears well-developed and well-nourished. No distress.   Non toxic appearing   HENT:   Head: Normocephalic.   Right Ear: External ear normal.   Left Ear: External ear normal.   Mouth/Throat: No oropharyngeal exudate.     Multiple missing teeth and teeth decay   Eyes: Conjunctivae are normal. Right eye exhibits no discharge. Left eye exhibits no discharge.   Neck: Normal range of motion. Neck supple. No thyromegaly present.   Cardiovascular: Normal rate, regular rhythm and normal heart sounds.    No murmur heard.  Pulmonary/Chest: Effort normal. No respiratory distress. He has no wheezes.   Coarse breath sounds, improved after forced cough.     Abdominal: Soft. Bowel sounds are normal. He exhibits no distension. There is no tenderness.   Musculoskeletal: Normal range of motion. He exhibits no edema or tenderness.   In wheelchair at visit.   Lymphadenopathy:     He has no cervical adenopathy.   Neurological: He is alert and oriented to person, place, and time. No cranial nerve deficit. Coordination normal.   Skin: Skin is warm and dry. No rash noted. No erythema.   Psychiatric: He has a normal mood and affect. His behavior is normal. Thought content normal.   Vitals reviewed.      ASSESSMENT/PLAN     (R05) Cough  (primary encounter diagnosis)  Comment: Cough persists.  Was hospitalized at [facility] for 15 through 4/17/2017. Documents reviewed. Chest x-ray no acute disease. Blood cultures ×2 were negative at 5 days. Troponins were negative. BNP was 83. CMP was normal CBC was normal except white count was 11.1.  Respiratory virus panel was negative. EKG shows sinus rhythm with a first-degree AV block. Right ventricle right bundle-branch block.  Caregiver and son at visit today. Caregiver states that the patient has been able to ambulate in the house use and his walker. Has occasional cough. Eating and drinking okay. Urinating and moving bowels okay.  Plan:   No further antibiotics indicated at this time.   Advised to add Mucinex 400 mg twice daily.   Advised to increase the DuoNeb treatments to every 6 hours.      XR CHEST PA AND LATERAL                    EXAM:  XR CHEST PA AND LATERAL    DATE OF EXAM:  4/26/2017 2:18 PM.    HISTORY:  cough.    COMPARISON:  7/23/2015.    FINDINGS:  PA and lateral chest. The heart size is at the upper limits of  normal. There is a moderate size hiatus hernia. There has been prior bypass  surgery. There are some fibrotic and atelectatic changes in the bases.  There are some calcified face on pleural plaques over the mid right chest.  I do not see any acute infiltrate or change.             Impression             IMPRESSION:  Chronic changes are noted in the chest. I see no acute  infiltrate.              Patient instructions:    May increase Duoneb treatments to every 6 hours.  May add Mucinex 400mg twice daily.

## 2022-08-15 NOTE — ANESTHESIA PROCEDURE NOTES
Airway  Urgency: elective    Date/Time: 8/15/2022 4:42 PM  Airway not difficult    General Information and Staff    Patient location during procedure: OR  CRNA/CAA: Chuck Whipple CRNA    Indications and Patient Condition  Indications for airway management: airway protection    Preoxygenated: yes  MILS not maintained throughout  Mask difficulty assessment: 1 - vent by mask    Final Airway Details  Final airway type: endotracheal airway      Successful airway: ETT  Cuffed: yes   Successful intubation technique: direct laryngoscopy  Facilitating devices/methods: intubating stylet  Endotracheal tube insertion site: oral  Blade: Beauchamp  Blade size: 2  ETT size (mm): 7.0  Cormack-Lehane Classification: grade I - full view of glottis  Placement verified by: chest auscultation and capnometry   Cuff volume (mL): 8  Measured from: lips  ETT/EBT  to lips (cm): 20  Number of attempts at approach: 1  Assessment: lips, teeth, and gum same as pre-op and atraumatic intubation    Additional Comments  Negative epigastric sounds, Breath sound equal bilaterally with symmetric chest rise and fall

## 2022-08-15 NOTE — H&P
Pre-Op H&P  Hilario Stewart  [id number]  1970      Chief complaint: Right clavicle pain, left trapezius pain, and right upper extremity sensory alteration      Subjective:  Patient is a 51 y.o.male presents for scheduled surgery by Dr. Almanza. He anticipates a Complete removal of cervical epidural spinal cord stimulator today. He underwent in May 2020 underwent spinal cord stimulator placement in the cervical region. Almost immediately after the surgery he began experiencing pain across his right clavicle. He tried PT without benefit. He hasn't charged the stimulator in several months without change in symptoms.       Review of Systems:  Constitutional-- No fever, chills or sweats. No fatigue.  CV-- No chest pain, palpitation or syncope  Resp-- No SOB, cough, hemoptysis  Skin--No rashes or lesions      Allergies:   Allergies   Allergen Reactions   • Coconut Fragrance Headache     PATIENT STATES HE IS INTOLERANT TO ALL FRAGRANCES, GETS SEVERE MIGRAINE WITH EXPOSURE TO ANY FRAGRANCE   • Latex Other (See Comments)     Skin irritation with long-term (>24hr) exposure (no issues with short term exposure)   • Tape Rash and Other (See Comments)     Short term application is no problem, but long term (> 24 hrs) will cause skin issues   • Adhesive Tape Rash   • Carbamazepine Other (See Comments)     Abdominal pain with extended use         Home Meds:  Medications Prior to Admission   Medication Sig Dispense Refill Last Dose   • cetirizine (zyrTEC) 10 MG tablet Take  by mouth Daily.   8/14/2022 at 2300   • mupirocin (BACTROBAN) 2 % nasal ointment Apply to the inside of each nostril with a cotton swab two times daily, morning and evening, for 5 days before surgery. 10 each 0 8/14/2022 at 2330   • pregabalin (LYRICA) 100 MG capsule Take 100 mg by mouth Every Night.   8/14/2022 at 2330   • pregabalin (LYRICA) 150 MG capsule Take 150 mg by mouth Every Morning.   8/14/2022 at 1030   • zolpidem (AMBIEN) 10 MG tablet Take  10 mg by mouth At Night As Needed.   Past Month at Unknown time   • rivaroxaban (XARELTO) 15 MG tablet Take 1 tablet by mouth Daily. 30 tablet 5 8/8/2022   • sildenafil (REVATIO) 20 MG tablet TAKE 2 TO 5 TABLETS BY MOUTH AS NEEDED PRIOR TO  INTERCOURSE   More than a month at Unknown time   • tadalafil (CIALIS) 10 MG tablet TAKE 1 TABLET BY MOUTH  ONCE DAILY AS NEEDED PRIOR TO INTERCOURSE   More than a month at Unknown time         PMH:   Past Medical History:   Diagnosis Date   • Basal cell carcinoma     scalp   • Bleeding disorder (HCC) May 2020    APLS diagnosis, currently on blood thinners   • Cancer (HCC) June 2012    Skin cancer (Basal cell carcinoma) on scalp, MOHS surgery   • Deep vein thrombosis (HCC) May 2020    Pulmonary Embolism detected   • Headache June 2012    MOHS surgery, right side temporal pain   • Pulmonary embolism (HCC)    • S/P insertion of spinal cord stimulator    • Trigeminal nerve disorder      PSH:    Past Surgical History:   Procedure Laterality Date   • COLONOSCOPY     • MOHS SURGERY      Scalp   • SPINAL CORD STIMUL[facility]  [facility] [place] [place]   • TRIGGER POINT INJECTION  Various dates in 2020 and 2021    Attempt to resolve shoulder and neck pain, some of which due to SCS implantation       Immunization History:  Influenza: 2021  Pneumococcal: No  Tetanus: UTD  Covid x3: 2021    Social History:   Tobacco:   Social History     Tobacco Use   Smoking Status Never Smoker   Smokeless Tobacco Never Used      Alcohol:     Social History     Substance and Sexual Activity   Alcohol Use Yes   • Alcohol/week: 5.0 standard drinks   • Types: 5 Cans of beer per week         Physical Exam:/75 (BP Location: Right arm, Patient Position: Lying)   Pulse 61   Temp 98.1 °F (36.7 °C) (Temporal)   Resp 18   SpO2 99%       General Appearance:    Alert, cooperative, no distress, appears stated age   Head:    Normocephalic, without obvious  abnormality, atraumatic   Lungs:     Clear to auscultation bilaterally, respirations unlabored    Heart:   Regular rate and rhythm, S1 and S2 normal    Abdomen:    Soft without tenderness   Extremities:   Extremities normal, atraumatic, no cyanosis or edema   Skin:   Skin color, texture, turgor normal, no rashes or lesions   Neurologic:   Grossly intact     Results Review:     LABS:  Lab Results   Component Value Date    WBC 4.93 08/12/2022    HGB 16.3 08/12/2022    HCT 48.8 08/12/2022    MCV 92.1 08/12/2022     08/12/2022    NEUTROABS 3.32 05/04/2021    GLUCOSE 124 (H) 05/04/2021    BUN 12 05/04/2021    CREATININE 1.07 05/04/2021    EGFRIFNONA 73 05/04/2021     05/04/2021    K 4.2 05/04/2021     05/04/2021    CO2 27.0 05/04/2021    CALCIUM 9.8 05/04/2021    ALBUMIN 4.20 05/04/2021    AST 21 05/04/2021    ALT 11 05/04/2021    BILITOT 0.5 05/04/2021       RADIOLOGY:  Imaging Results (Last 72 Hours)     ** No results found for the last 72 hours. **          I reviewed the patient's new clinical results.    Cancer Staging (if applicable)  Cancer Patient: __ yes __no __unknown; If yes, clinical stage T:__ N:__M:__, stage group or __N/A      Impression: Malfunction of spinal cord stimulator       Plan: Complete removal of cervical epidural spinal cord stimulator      Angella Augustin, VARUN   8/15/2022   13:23 EDT

## 2022-08-15 NOTE — ANESTHESIA PREPROCEDURE EVALUATION
Anesthesia Evaluation     Patient summary reviewed and Nursing notes reviewed                Airway   Mallampati: II  Dental      Pulmonary - negative pulmonary ROS   Cardiovascular     (+) DVT,       Neuro/Psych- negative ROS  GI/Hepatic/Renal/Endo - negative ROS     Musculoskeletal (-) negative ROS    Abdominal    Substance History - negative use     OB/GYN negative ob/gyn ROS         Other                        Anesthesia Plan    ASA 3     general     intravenous induction     Anesthetic plan, risks, benefits, and alternatives have been provided, discussed and informed consent has been obtained with: patient.        CODE STATUS:

## 2022-08-26 ENCOUNTER — OFFICE VISIT (OUTPATIENT)
Dept: NEUROSURGERY | Facility: CLINIC | Age: 52
End: 2022-08-26

## 2022-08-26 VITALS — WEIGHT: 153.6 LBS | TEMPERATURE: 97.8 F | BODY MASS INDEX: 21.99 KG/M2 | HEIGHT: 70 IN

## 2022-08-26 DIAGNOSIS — T85.192A MALFUNCTION OF SPINAL CORD STIMULATOR, INITIAL ENCOUNTER: Primary | ICD-10-CM

## 2022-08-26 PROCEDURE — 99024 POSTOP FOLLOW-UP VISIT: CPT | Performed by: PHYSICIAN ASSISTANT

## 2022-08-26 NOTE — PROGRESS NOTES
"Subjective     Chief Complaint: Malfunctioning and painful cervical epidural spinal cord stimulator    Patient ID: Hilario Stewart is a 51 y.o. male is here today for follow-up.    History of Present Illness  The patient is a 51-year-old gentleman who a couple years ago underwent cervical epidural spinal cord stimulator placement to try and help with scalp pain after a skin cancer surgery.  He has had severe clavicular pain with sensory alteration involving his arm since that time.  He is no longer using the spinal cord stimulator and given the associated pain he presented for removal of the entire system.  Surgery was done on 8/15/2022 and the entire system was removed per Dr. Almanza's note.  He tolerated the procedure well and was discharged to home from the recovery room.  Today is his first follow-up visit for wound check.  He denies any fevers, chills or problems with his incisions.  He he feels that there is still a \"ridge \"\" across his clavicle that feels like the lead though it is smaller than it had been      The following portions of the patient's history were reviewed and updated as appropriate: allergies, current medications, past family history, past medical history, past social history, past surgical history and problem list.    Family history:   Family History   Problem Relation Age of Onset   • Prostate cancer Father    • Skin cancer Father    • Cancer Father         Skin and prostate cancer       Social history:   Social History     Socioeconomic History   • Marital status: Significant Other   Tobacco Use   • Smoking status: Never Smoker   • Smokeless tobacco: Never Used   Vaping Use   • Vaping Use: Never used   Substance and Sexual Activity   • Alcohol use: Yes     Alcohol/week: 5.0 standard drinks     Types: 5 Cans of beer per week   • Drug use: Never   • Sexual activity: Defer       Review of Systems   Constitutional: Negative for activity change, appetite change, chills, diaphoresis, fatigue, " "fever and unexpected weight change.   HENT: Negative for congestion, dental problem, drooling, ear discharge, ear pain, facial swelling, hearing loss, mouth sores, nosebleeds, postnasal drip, rhinorrhea, sinus pressure, sinus pain, sneezing, sore throat, tinnitus, trouble swallowing and voice change.    Eyes: Negative for photophobia, pain, discharge, redness, itching and visual disturbance.   Respiratory: Negative for apnea, cough, choking, chest tightness, shortness of breath, wheezing and stridor.    Cardiovascular: Negative for chest pain, palpitations and leg swelling.   Gastrointestinal: Negative for abdominal distention, abdominal pain, anal bleeding, blood in stool, constipation, diarrhea, nausea, rectal pain and vomiting.   Endocrine: Negative for cold intolerance, heat intolerance, polydipsia, polyphagia and polyuria.   Genitourinary: Negative for decreased urine volume, difficulty urinating, dysuria, enuresis, flank pain, frequency, genital sores, hematuria and urgency.   Musculoskeletal: Positive for myalgias and neck stiffness. Negative for arthralgias, back pain, gait problem, joint swelling and neck pain.   Skin: Negative for color change, pallor, rash and wound.   Allergic/Immunologic: Negative for environmental allergies, food allergies and immunocompromised state.   Neurological: Negative for dizziness, tremors, seizures, syncope, facial asymmetry, speech difficulty, weakness, light-headedness, numbness and headaches.   Hematological: Negative for adenopathy. Does not bruise/bleed easily.   Psychiatric/Behavioral: Negative for agitation, behavioral problems, confusion, decreased concentration, dysphoric mood, hallucinations, self-injury, sleep disturbance and suicidal ideas. The patient is not nervous/anxious and is not hyperactive.    All other systems reviewed and are negative.      Objective   Temperature 97.8 °F (36.6 °C), height 177.8 cm (70\"), weight 69.7 kg (153 lb 9.6 oz).  Body mass index " is 22.04 kg/m².    Physical Exam  Physical Exam  Constitutional:       Appearance: Normal appearance.   Eyes:      Pupils: Pupils are equal, round, and reactive to light.   Cardiovascular:      Pulses: Normal pulses.   ____Pulmonary:      Effort: Pulmonary effort is normal.      Breath sounds: Normal breath sounds.   Musculoskeletal:      Comments: upper and lower extremity strength intact     Skin:     Comments: both his posterior cervical incision and anterior right chest incision are clean, dry, and intact with no swelling, tenderness, or erythema.  There is a rope like swelling palpable over the right clavicle.   _Neurological:      General: No focal deficit present.      Mental Status:   alert and oriented to person, place, and time.   Psychiatric:         Mood and Affect: Mood normal.         Behavior: Behavior normal.      Assessment & Plan    Dr. Almanza's operative report was reviewed carefully.  He documents twice removal of the entire system.  The palpable ridge that is present on Mr. Christensen right clavicle is likely a residual reactive scar tissue that had formed around the lead.  He does acknowledge that it is much smaller than it had been.    His incisions are healing very well.  It is expected that he will always be able to feel this small area of swelling but that it will be less irritated within a couple of weeks after surgery.    He is not to immerse his incision for at least another week.  We will follow-up with him on an as-needed basis going forward.  He is urged to call if he has any signs of infection or other concerns.    Diagnoses and all orders for this visit:    1. Malfunction of spinal cord stimulator, initial encounter (HCC) (Primary)    s/p removal of SCS    Return if symptoms worsen or fail to improve.        Val Bolden PA-C     This document signed by Val Bolden PA-C  August 26, 2022 12:55 EDT      Answers for HPI/ROS submitted by the patient on 8/26/2022  What is the  primary reason for your visit?: Other  Please describe your symptoms.: Follow-up for SCS removal  Have you had these symptoms before?: Yes  How long have you been having these symptoms?: Greater than 2 weeks  Please list any medications you are currently taking for this condition.: N/A  Please describe any probable cause for these symptoms. : N/A
